# Patient Record
Sex: FEMALE | Race: WHITE | NOT HISPANIC OR LATINO | Employment: OTHER | URBAN - METROPOLITAN AREA
[De-identification: names, ages, dates, MRNs, and addresses within clinical notes are randomized per-mention and may not be internally consistent; named-entity substitution may affect disease eponyms.]

---

## 2023-03-24 PROBLEM — J96.11 CHRONIC RESPIRATORY FAILURE WITH HYPOXIA, ON HOME O2 THERAPY (MULTI): Status: ACTIVE | Noted: 2023-03-24

## 2023-03-24 PROBLEM — J02.9 SORE THROAT: Status: ACTIVE | Noted: 2023-03-24

## 2023-03-24 PROBLEM — F41.1 GAD (GENERALIZED ANXIETY DISORDER): Status: ACTIVE | Noted: 2023-03-24

## 2023-03-24 PROBLEM — R06.02 SOB (SHORTNESS OF BREATH): Status: ACTIVE | Noted: 2023-03-24

## 2023-03-24 PROBLEM — J44.9 COPD, MODERATE (MULTI): Status: ACTIVE | Noted: 2023-03-24

## 2023-03-24 PROBLEM — F41.9 ANXIETY AND DEPRESSION: Status: ACTIVE | Noted: 2023-03-24

## 2023-03-24 PROBLEM — S20.219A RIB CONTUSION: Status: ACTIVE | Noted: 2023-03-24

## 2023-03-24 PROBLEM — R07.81 RIB PAIN ON LEFT SIDE: Status: ACTIVE | Noted: 2023-03-24

## 2023-03-24 PROBLEM — M25.512 LEFT SHOULDER PAIN: Status: ACTIVE | Noted: 2023-03-24

## 2023-03-24 PROBLEM — I25.10 CAD (CORONARY ATHEROSCLEROTIC DISEASE): Status: ACTIVE | Noted: 2023-03-24

## 2023-03-24 PROBLEM — K21.9 CHRONIC GERD: Status: ACTIVE | Noted: 2023-03-24

## 2023-03-24 PROBLEM — J30.2 SEASONAL ALLERGIES: Status: ACTIVE | Noted: 2023-03-24

## 2023-03-24 PROBLEM — I10 ESSENTIAL HYPERTENSION: Status: ACTIVE | Noted: 2023-03-24

## 2023-03-24 PROBLEM — E78.5 HYPERLIPIDEMIA: Status: ACTIVE | Noted: 2023-03-24

## 2023-03-24 PROBLEM — R09.89 CAROTID BRUIT: Status: ACTIVE | Noted: 2023-03-24

## 2023-03-24 PROBLEM — J31.0 CHRONIC RHINITIS: Status: ACTIVE | Noted: 2023-03-24

## 2023-03-24 PROBLEM — H66.001 NON-RECURRENT ACUTE SUPPURATIVE OTITIS MEDIA OF RIGHT EAR WITHOUT SPONTANEOUS RUPTURE OF TYMPANIC MEMBRANE: Status: ACTIVE | Noted: 2023-03-24

## 2023-03-24 PROBLEM — Z99.81 CHRONIC RESPIRATORY FAILURE WITH HYPOXIA, ON HOME O2 THERAPY (MULTI): Status: ACTIVE | Noted: 2023-03-24

## 2023-03-24 PROBLEM — F32.A ANXIETY AND DEPRESSION: Status: ACTIVE | Noted: 2023-03-24

## 2023-03-24 PROBLEM — Z98.61 POST PTCA: Status: ACTIVE | Noted: 2023-03-24

## 2024-03-05 ENCOUNTER — HOSPITAL ENCOUNTER (INPATIENT)
Facility: HOSPITAL | Age: 69
LOS: 2 days | Discharge: HOME | DRG: 190 | End: 2024-03-09
Attending: EMERGENCY MEDICINE | Admitting: INTERNAL MEDICINE
Payer: MEDICARE

## 2024-03-05 DIAGNOSIS — J44.1 COPD EXACERBATION (MULTI): Primary | ICD-10-CM

## 2024-03-05 PROCEDURE — 99285 EMERGENCY DEPT VISIT HI MDM: CPT | Mod: 25

## 2024-03-05 PROCEDURE — 93005 ELECTROCARDIOGRAM TRACING: CPT

## 2024-03-05 RX ORDER — IPRATROPIUM BROMIDE AND ALBUTEROL SULFATE 2.5; .5 MG/3ML; MG/3ML
9 SOLUTION RESPIRATORY (INHALATION) ONCE
Status: COMPLETED | OUTPATIENT
Start: 2024-03-05 | End: 2024-03-06

## 2024-03-05 ASSESSMENT — COLUMBIA-SUICIDE SEVERITY RATING SCALE - C-SSRS
1. IN THE PAST MONTH, HAVE YOU WISHED YOU WERE DEAD OR WISHED YOU COULD GO TO SLEEP AND NOT WAKE UP?: NO
2. HAVE YOU ACTUALLY HAD ANY THOUGHTS OF KILLING YOURSELF?: NO
6. HAVE YOU EVER DONE ANYTHING, STARTED TO DO ANYTHING, OR PREPARED TO DO ANYTHING TO END YOUR LIFE?: NO

## 2024-03-05 ASSESSMENT — LIFESTYLE VARIABLES
HAVE PEOPLE ANNOYED YOU BY CRITICIZING YOUR DRINKING: NO
EVER HAD A DRINK FIRST THING IN THE MORNING TO STEADY YOUR NERVES TO GET RID OF A HANGOVER: NO
HAVE YOU EVER FELT YOU SHOULD CUT DOWN ON YOUR DRINKING: NO
EVER FELT BAD OR GUILTY ABOUT YOUR DRINKING: NO

## 2024-03-05 ASSESSMENT — PAIN - FUNCTIONAL ASSESSMENT: PAIN_FUNCTIONAL_ASSESSMENT: 0-10

## 2024-03-05 ASSESSMENT — PAIN SCALES - GENERAL: PAINLEVEL_OUTOF10: 6

## 2024-03-06 ENCOUNTER — APPOINTMENT (OUTPATIENT)
Dept: RADIOLOGY | Facility: HOSPITAL | Age: 69
DRG: 190 | End: 2024-03-06
Payer: MEDICARE

## 2024-03-06 ENCOUNTER — APPOINTMENT (OUTPATIENT)
Dept: CARDIOLOGY | Facility: HOSPITAL | Age: 69
DRG: 190 | End: 2024-03-06
Payer: MEDICARE

## 2024-03-06 PROBLEM — J44.1 COPD EXACERBATION (MULTI): Status: ACTIVE | Noted: 2024-03-06

## 2024-03-06 LAB
ALBUMIN SERPL BCP-MCNC: 3.8 G/DL (ref 3.4–5)
ALP SERPL-CCNC: 70 U/L (ref 33–136)
ALT SERPL W P-5'-P-CCNC: 25 U/L (ref 7–45)
ANION GAP BLDV CALCULATED.4IONS-SCNC: 5 MMOL/L (ref 10–25)
ANION GAP SERPL CALC-SCNC: 11 MMOL/L (ref 10–20)
ANION GAP SERPL CALC-SCNC: 13 MMOL/L (ref 10–20)
AST SERPL W P-5'-P-CCNC: 16 U/L (ref 9–39)
BASE EXCESS BLDV CALC-SCNC: 3.9 MMOL/L (ref -2–3)
BASE EXCESS BLDV CALC-SCNC: 5.9 MMOL/L (ref -2–3)
BASOPHILS # BLD AUTO: 0.05 X10*3/UL (ref 0–0.1)
BASOPHILS NFR BLD AUTO: 0.5 %
BILIRUB SERPL-MCNC: 0.3 MG/DL (ref 0–1.2)
BNP SERPL-MCNC: 24 PG/ML (ref 0–99)
BODY TEMPERATURE: 37 DEGREES CELSIUS
BODY TEMPERATURE: 37 DEGREES CELSIUS
BUN SERPL-MCNC: 25 MG/DL (ref 6–23)
BUN SERPL-MCNC: 25 MG/DL (ref 6–23)
CA-I BLDV-SCNC: 1.18 MMOL/L (ref 1.1–1.33)
CALCIUM SERPL-MCNC: 8.5 MG/DL (ref 8.6–10.3)
CALCIUM SERPL-MCNC: 9 MG/DL (ref 8.6–10.3)
CARDIAC TROPONIN I PNL SERPL HS: 3 NG/L (ref 0–13)
CARDIAC TROPONIN I PNL SERPL HS: 3 NG/L (ref 0–13)
CARDIAC TROPONIN I PNL SERPL HS: <3 NG/L (ref 0–13)
CHLORIDE BLDV-SCNC: 101 MMOL/L (ref 98–107)
CHLORIDE SERPL-SCNC: 100 MMOL/L (ref 98–107)
CHLORIDE SERPL-SCNC: 100 MMOL/L (ref 98–107)
CO2 SERPL-SCNC: 28 MMOL/L (ref 21–32)
CO2 SERPL-SCNC: 30 MMOL/L (ref 21–32)
CREAT SERPL-MCNC: 0.81 MG/DL (ref 0.5–1.05)
CREAT SERPL-MCNC: 0.9 MG/DL (ref 0.5–1.05)
EGFRCR SERPLBLD CKD-EPI 2021: 70 ML/MIN/1.73M*2
EGFRCR SERPLBLD CKD-EPI 2021: 80 ML/MIN/1.73M*2
EOSINOPHIL # BLD AUTO: 0.19 X10*3/UL (ref 0–0.7)
EOSINOPHIL NFR BLD AUTO: 1.9 %
ERYTHROCYTE [DISTWIDTH] IN BLOOD BY AUTOMATED COUNT: 12.6 % (ref 11.5–14.5)
ERYTHROCYTE [DISTWIDTH] IN BLOOD BY AUTOMATED COUNT: 12.8 % (ref 11.5–14.5)
EST. AVERAGE GLUCOSE BLD GHB EST-MCNC: 137 MG/DL
FLUAV RNA RESP QL NAA+PROBE: NOT DETECTED
FLUBV RNA RESP QL NAA+PROBE: NOT DETECTED
GLUCOSE BLDV-MCNC: 139 MG/DL (ref 74–99)
GLUCOSE SERPL-MCNC: 147 MG/DL (ref 74–99)
GLUCOSE SERPL-MCNC: 153 MG/DL (ref 74–99)
HBA1C MFR BLD: 6.4 %
HCO3 BLDV-SCNC: 30.2 MMOL/L (ref 22–26)
HCO3 BLDV-SCNC: 32.5 MMOL/L (ref 22–26)
HCT VFR BLD AUTO: 37.9 % (ref 36–46)
HCT VFR BLD AUTO: 39.5 % (ref 36–46)
HCT VFR BLD EST: 38 % (ref 36–46)
HGB BLD-MCNC: 12.8 G/DL (ref 12–16)
HGB BLD-MCNC: 12.9 G/DL (ref 12–16)
HGB BLDV-MCNC: 12.8 G/DL (ref 12–16)
IMM GRANULOCYTES # BLD AUTO: 0.05 X10*3/UL (ref 0–0.7)
IMM GRANULOCYTES NFR BLD AUTO: 0.5 % (ref 0–0.9)
INHALED O2 CONCENTRATION: 28 %
INHALED O2 CONCENTRATION: 28 %
LACTATE BLDV-SCNC: 2.4 MMOL/L (ref 0.4–2)
LACTATE BLDV-SCNC: 3.4 MMOL/L (ref 0.4–2)
LIPASE SERPL-CCNC: 50 U/L (ref 9–82)
LYMPHOCYTES # BLD AUTO: 3.6 X10*3/UL (ref 1.2–4.8)
LYMPHOCYTES NFR BLD AUTO: 35.6 %
MCH RBC QN AUTO: 30.6 PG (ref 26–34)
MCH RBC QN AUTO: 31.4 PG (ref 26–34)
MCHC RBC AUTO-ENTMCNC: 32.7 G/DL (ref 32–36)
MCHC RBC AUTO-ENTMCNC: 33.8 G/DL (ref 32–36)
MCV RBC AUTO: 93 FL (ref 80–100)
MCV RBC AUTO: 94 FL (ref 80–100)
MONOCYTES # BLD AUTO: 0.77 X10*3/UL (ref 0.1–1)
MONOCYTES NFR BLD AUTO: 7.6 %
NEUTROPHILS # BLD AUTO: 5.46 X10*3/UL (ref 1.2–7.7)
NEUTROPHILS NFR BLD AUTO: 53.9 %
NRBC BLD-RTO: 0 /100 WBCS (ref 0–0)
NRBC BLD-RTO: 0 /100 WBCS (ref 0–0)
OXYHGB MFR BLDV: 84.2 % (ref 45–75)
OXYHGB MFR BLDV: 89.1 % (ref 45–75)
PCO2 BLDV: 51 MM HG (ref 41–51)
PCO2 BLDV: 55 MM HG (ref 41–51)
PH BLDV: 7.38 PH (ref 7.33–7.43)
PH BLDV: 7.38 PH (ref 7.33–7.43)
PLATELET # BLD AUTO: 160 X10*3/UL (ref 150–450)
PLATELET # BLD AUTO: 168 X10*3/UL (ref 150–450)
PO2 BLDV: 59 MM HG (ref 35–45)
PO2 BLDV: 67 MM HG (ref 35–45)
POTASSIUM BLDV-SCNC: 3.8 MMOL/L (ref 3.5–5.3)
POTASSIUM SERPL-SCNC: 3.5 MMOL/L (ref 3.5–5.3)
POTASSIUM SERPL-SCNC: 3.9 MMOL/L (ref 3.5–5.3)
PROT SERPL-MCNC: 6.6 G/DL (ref 6.4–8.2)
RBC # BLD AUTO: 4.07 X10*6/UL (ref 4–5.2)
RBC # BLD AUTO: 4.21 X10*6/UL (ref 4–5.2)
RSV RNA RESP QL NAA+PROBE: NOT DETECTED
SAO2 % BLDV: 87 % (ref 45–75)
SAO2 % BLDV: 92 % (ref 45–75)
SARS-COV-2 RNA RESP QL NAA+PROBE: NOT DETECTED
SODIUM BLDV-SCNC: 135 MMOL/L (ref 136–145)
SODIUM SERPL-SCNC: 137 MMOL/L (ref 136–145)
SODIUM SERPL-SCNC: 137 MMOL/L (ref 136–145)
WBC # BLD AUTO: 10.1 X10*3/UL (ref 4.4–11.3)
WBC # BLD AUTO: 7.8 X10*3/UL (ref 4.4–11.3)

## 2024-03-06 PROCEDURE — 85025 COMPLETE CBC W/AUTO DIFF WBC: CPT | Performed by: EMERGENCY MEDICINE

## 2024-03-06 PROCEDURE — 80053 COMPREHEN METABOLIC PANEL: CPT | Performed by: EMERGENCY MEDICINE

## 2024-03-06 PROCEDURE — 83690 ASSAY OF LIPASE: CPT | Performed by: EMERGENCY MEDICINE

## 2024-03-06 PROCEDURE — 83605 ASSAY OF LACTIC ACID: CPT | Performed by: EMERGENCY MEDICINE

## 2024-03-06 PROCEDURE — G0378 HOSPITAL OBSERVATION PER HR: HCPCS

## 2024-03-06 PROCEDURE — 83036 HEMOGLOBIN GLYCOSYLATED A1C: CPT | Performed by: INTERNAL MEDICINE

## 2024-03-06 PROCEDURE — 71045 X-RAY EXAM CHEST 1 VIEW: CPT | Performed by: RADIOLOGY

## 2024-03-06 PROCEDURE — 96374 THER/PROPH/DIAG INJ IV PUSH: CPT

## 2024-03-06 PROCEDURE — 96372 THER/PROPH/DIAG INJ SC/IM: CPT

## 2024-03-06 PROCEDURE — 2500000002 HC RX 250 W HCPCS SELF ADMINISTERED DRUGS (ALT 637 FOR MEDICARE OP, ALT 636 FOR OP/ED): Performed by: EMERGENCY MEDICINE

## 2024-03-06 PROCEDURE — 2500000001 HC RX 250 WO HCPCS SELF ADMINISTERED DRUGS (ALT 637 FOR MEDICARE OP): Performed by: INTERNAL MEDICINE

## 2024-03-06 PROCEDURE — 99223 1ST HOSP IP/OBS HIGH 75: CPT | Performed by: INTERNAL MEDICINE

## 2024-03-06 PROCEDURE — 84484 ASSAY OF TROPONIN QUANT: CPT | Performed by: EMERGENCY MEDICINE

## 2024-03-06 PROCEDURE — 96361 HYDRATE IV INFUSION ADD-ON: CPT

## 2024-03-06 PROCEDURE — 87637 SARSCOV2&INF A&B&RSV AMP PRB: CPT | Performed by: EMERGENCY MEDICINE

## 2024-03-06 PROCEDURE — 36415 COLL VENOUS BLD VENIPUNCTURE: CPT | Performed by: EMERGENCY MEDICINE

## 2024-03-06 PROCEDURE — 84132 ASSAY OF SERUM POTASSIUM: CPT | Performed by: INTERNAL MEDICINE

## 2024-03-06 PROCEDURE — 84132 ASSAY OF SERUM POTASSIUM: CPT | Performed by: EMERGENCY MEDICINE

## 2024-03-06 PROCEDURE — 83880 ASSAY OF NATRIURETIC PEPTIDE: CPT | Performed by: EMERGENCY MEDICINE

## 2024-03-06 PROCEDURE — 85027 COMPLETE CBC AUTOMATED: CPT | Performed by: INTERNAL MEDICINE

## 2024-03-06 PROCEDURE — 82805 BLOOD GASES W/O2 SATURATION: CPT | Performed by: INTERNAL MEDICINE

## 2024-03-06 PROCEDURE — 71045 X-RAY EXAM CHEST 1 VIEW: CPT

## 2024-03-06 PROCEDURE — 2500000004 HC RX 250 GENERAL PHARMACY W/ HCPCS (ALT 636 FOR OP/ED): Performed by: INTERNAL MEDICINE

## 2024-03-06 PROCEDURE — 2500000002 HC RX 250 W HCPCS SELF ADMINISTERED DRUGS (ALT 637 FOR MEDICARE OP, ALT 636 FOR OP/ED): Performed by: INTERNAL MEDICINE

## 2024-03-06 PROCEDURE — 84484 ASSAY OF TROPONIN QUANT: CPT | Performed by: INTERNAL MEDICINE

## 2024-03-06 RX ORDER — ACETAMINOPHEN 160 MG/5ML
650 SUSPENSION ORAL EVERY 4 HOURS PRN
Status: DISCONTINUED | OUTPATIENT
Start: 2024-03-06 | End: 2024-03-09 | Stop reason: HOSPADM

## 2024-03-06 RX ORDER — METOPROLOL TARTRATE 25 MG/1
25 TABLET, FILM COATED ORAL 2 TIMES DAILY
Status: DISCONTINUED | OUTPATIENT
Start: 2024-03-06 | End: 2024-03-09 | Stop reason: HOSPADM

## 2024-03-06 RX ORDER — ALBUTEROL SULFATE 0.83 MG/ML
2.5 SOLUTION RESPIRATORY (INHALATION) EVERY 6 HOURS PRN
Status: DISCONTINUED | OUTPATIENT
Start: 2024-03-06 | End: 2024-03-09 | Stop reason: HOSPADM

## 2024-03-06 RX ORDER — SODIUM CHLORIDE 9 MG/ML
75 INJECTION, SOLUTION INTRAVENOUS CONTINUOUS
Status: ACTIVE | OUTPATIENT
Start: 2024-03-06 | End: 2024-03-07

## 2024-03-06 RX ORDER — ACETAMINOPHEN 650 MG/1
650 SUPPOSITORY RECTAL EVERY 4 HOURS PRN
Status: DISCONTINUED | OUTPATIENT
Start: 2024-03-06 | End: 2024-03-09 | Stop reason: HOSPADM

## 2024-03-06 RX ORDER — CLOPIDOGREL BISULFATE 75 MG/1
75 TABLET ORAL DAILY
Status: DISCONTINUED | OUTPATIENT
Start: 2024-03-06 | End: 2024-03-09 | Stop reason: HOSPADM

## 2024-03-06 RX ORDER — ASPIRIN 81 MG/1
81 TABLET ORAL DAILY
Status: DISCONTINUED | OUTPATIENT
Start: 2024-03-06 | End: 2024-03-09 | Stop reason: HOSPADM

## 2024-03-06 RX ORDER — PANTOPRAZOLE SODIUM 40 MG/1
40 TABLET, DELAYED RELEASE ORAL
Status: DISCONTINUED | OUTPATIENT
Start: 2024-03-06 | End: 2024-03-09 | Stop reason: HOSPADM

## 2024-03-06 RX ORDER — TRAZODONE HYDROCHLORIDE 50 MG/1
50 TABLET ORAL NIGHTLY PRN
Status: DISCONTINUED | OUTPATIENT
Start: 2024-03-06 | End: 2024-03-09 | Stop reason: HOSPADM

## 2024-03-06 RX ORDER — GUAIFENESIN/DEXTROMETHORPHAN 100-10MG/5
5 SYRUP ORAL EVERY 4 HOURS PRN
Status: DISCONTINUED | OUTPATIENT
Start: 2024-03-06 | End: 2024-03-09 | Stop reason: HOSPADM

## 2024-03-06 RX ORDER — ACETAMINOPHEN 325 MG/1
650 TABLET ORAL EVERY 4 HOURS PRN
Status: DISCONTINUED | OUTPATIENT
Start: 2024-03-06 | End: 2024-03-09 | Stop reason: HOSPADM

## 2024-03-06 RX ORDER — DOXYCYCLINE 100 MG/1
100 CAPSULE ORAL 2 TIMES DAILY
Status: DISCONTINUED | OUTPATIENT
Start: 2024-03-06 | End: 2024-03-09 | Stop reason: HOSPADM

## 2024-03-06 RX ORDER — ALBUTEROL SULFATE 0.63 MG/3ML
0.63 SOLUTION RESPIRATORY (INHALATION) EVERY 4 HOURS PRN
COMMUNITY

## 2024-03-06 RX ORDER — ROSUVASTATIN CALCIUM 20 MG/1
20 TABLET, COATED ORAL DAILY
Status: DISCONTINUED | OUTPATIENT
Start: 2024-03-06 | End: 2024-03-09 | Stop reason: HOSPADM

## 2024-03-06 RX ORDER — LISINOPRIL 20 MG/1
40 TABLET ORAL DAILY
Status: DISCONTINUED | OUTPATIENT
Start: 2024-03-06 | End: 2024-03-09 | Stop reason: HOSPADM

## 2024-03-06 RX ORDER — BUSPIRONE HYDROCHLORIDE 5 MG/1
10 TABLET ORAL EVERY 12 HOURS
Status: DISCONTINUED | OUTPATIENT
Start: 2024-03-06 | End: 2024-03-09 | Stop reason: HOSPADM

## 2024-03-06 RX ORDER — ENOXAPARIN SODIUM 100 MG/ML
40 INJECTION SUBCUTANEOUS EVERY 24 HOURS
Status: DISCONTINUED | OUTPATIENT
Start: 2024-03-06 | End: 2024-03-09 | Stop reason: HOSPADM

## 2024-03-06 RX ADMIN — ROSUVASTATIN CALCIUM 20 MG: 20 TABLET, FILM COATED ORAL at 09:11

## 2024-03-06 RX ADMIN — ACETAMINOPHEN 650 MG: 325 TABLET ORAL at 18:52

## 2024-03-06 RX ADMIN — METHYLPREDNISOLONE SODIUM SUCCINATE 40 MG: 40 INJECTION, POWDER, FOR SOLUTION INTRAMUSCULAR; INTRAVENOUS at 18:50

## 2024-03-06 RX ADMIN — PANTOPRAZOLE SODIUM 40 MG: 40 TABLET, DELAYED RELEASE ORAL at 07:25

## 2024-03-06 RX ADMIN — BUSPIRONE HYDROCHLORIDE 10 MG: 5 TABLET ORAL at 04:24

## 2024-03-06 RX ADMIN — METHYLPREDNISOLONE SODIUM SUCCINATE 40 MG: 40 INJECTION, POWDER, FOR SOLUTION INTRAMUSCULAR; INTRAVENOUS at 08:01

## 2024-03-06 RX ADMIN — BUSPIRONE HYDROCHLORIDE 10 MG: 5 TABLET ORAL at 18:51

## 2024-03-06 RX ADMIN — DOXYCYCLINE 100 MG: 100 CAPSULE ORAL at 21:36

## 2024-03-06 RX ADMIN — CLOPIDOGREL 75 MG: 75 TABLET ORAL at 09:10

## 2024-03-06 RX ADMIN — ALBUTEROL SULFATE 2.5 MG: 2.5 SOLUTION RESPIRATORY (INHALATION) at 09:42

## 2024-03-06 RX ADMIN — SODIUM CHLORIDE 75 ML/HR: 9 INJECTION, SOLUTION INTRAVENOUS at 04:24

## 2024-03-06 RX ADMIN — LISINOPRIL 40 MG: 20 TABLET ORAL at 09:57

## 2024-03-06 RX ADMIN — DOXYCYCLINE 100 MG: 100 CAPSULE ORAL at 09:11

## 2024-03-06 RX ADMIN — ACETAMINOPHEN 650 MG: 325 TABLET ORAL at 09:58

## 2024-03-06 RX ADMIN — ASPIRIN 81 MG: 81 TABLET, COATED ORAL at 09:10

## 2024-03-06 RX ADMIN — TRAZODONE HYDROCHLORIDE 50 MG: 50 TABLET ORAL at 21:48

## 2024-03-06 RX ADMIN — IPRATROPIUM BROMIDE AND ALBUTEROL SULFATE 9 ML: 2.5; .5 SOLUTION RESPIRATORY (INHALATION) at 00:01

## 2024-03-06 RX ADMIN — ENOXAPARIN SODIUM 40 MG: 40 INJECTION SUBCUTANEOUS at 04:24

## 2024-03-06 RX ADMIN — SODIUM CHLORIDE 75 ML/HR: 9 INJECTION, SOLUTION INTRAVENOUS at 18:57

## 2024-03-06 RX ADMIN — METOPROLOL TARTRATE 25 MG: 25 TABLET, FILM COATED ORAL at 09:15

## 2024-03-06 RX ADMIN — METOPROLOL TARTRATE 25 MG: 25 TABLET, FILM COATED ORAL at 21:36

## 2024-03-06 SDOH — SOCIAL STABILITY: SOCIAL INSECURITY: ABUSE: ADULT

## 2024-03-06 SDOH — SOCIAL STABILITY: SOCIAL INSECURITY: WERE YOU ABLE TO COMPLETE ALL THE BEHAVIORAL HEALTH SCREENINGS?: YES

## 2024-03-06 SDOH — SOCIAL STABILITY: SOCIAL INSECURITY: HAVE YOU HAD THOUGHTS OF HARMING ANYONE ELSE?: NO

## 2024-03-06 ASSESSMENT — ENCOUNTER SYMPTOMS
PALPITATIONS: 0
EYES NEGATIVE: 1
SEIZURES: 0
SORE THROAT: 1
CHEST TIGHTNESS: 0
CHILLS: 1
NUMBNESS: 0
DYSURIA: 0
NAUSEA: 1
ABDOMINAL PAIN: 0
WHEEZING: 1
DIZZINESS: 0
COUGH: 1
WEAKNESS: 1
BACK PAIN: 1
FATIGUE: 1
SPEECH DIFFICULTY: 0
VOMITING: 0
CONFUSION: 0
FLANK PAIN: 0
NECK PAIN: 0
BLOOD IN STOOL: 0
POLYDIPSIA: 0
FEVER: 0
SHORTNESS OF BREATH: 1

## 2024-03-06 ASSESSMENT — COGNITIVE AND FUNCTIONAL STATUS - GENERAL
CLIMB 3 TO 5 STEPS WITH RAILING: A LITTLE
WALKING IN HOSPITAL ROOM: A LITTLE
MOBILITY SCORE: 22
DAILY ACTIVITIY SCORE: 23
PATIENT BASELINE BEDBOUND: NO
MOBILITY SCORE: 23
HELP NEEDED FOR BATHING: A LITTLE
DAILY ACTIVITIY SCORE: 24
CLIMB 3 TO 5 STEPS WITH RAILING: A LITTLE

## 2024-03-06 ASSESSMENT — ACTIVITIES OF DAILY LIVING (ADL)
ADEQUATE_TO_COMPLETE_ADL: YES
HEARING - LEFT EAR: FUNCTIONAL
BATHING: INDEPENDENT
WALKS IN HOME: INDEPENDENT
LACK_OF_TRANSPORTATION: NO
DRESSING YOURSELF: INDEPENDENT
JUDGMENT_ADEQUATE_SAFELY_COMPLETE_DAILY_ACTIVITIES: YES
GROOMING: INDEPENDENT
PATIENT'S MEMORY ADEQUATE TO SAFELY COMPLETE DAILY ACTIVITIES?: YES
TOILETING: INDEPENDENT
HEARING - RIGHT EAR: FUNCTIONAL
ASSISTIVE_DEVICE: OTHER (COMMENT)
FEEDING YOURSELF: INDEPENDENT

## 2024-03-06 ASSESSMENT — PAIN DESCRIPTION - LOCATION
LOCATION: RIB CAGE
LOCATION: HEAD

## 2024-03-06 ASSESSMENT — LIFESTYLE VARIABLES
HOW MANY STANDARD DRINKS CONTAINING ALCOHOL DO YOU HAVE ON A TYPICAL DAY: PATIENT DOES NOT DRINK
SKIP TO QUESTIONS 9-10: 1
AUDIT-C TOTAL SCORE: 0
AUDIT-C TOTAL SCORE: 0
HOW OFTEN DO YOU HAVE 6 OR MORE DRINKS ON ONE OCCASION: NEVER
HOW OFTEN DO YOU HAVE A DRINK CONTAINING ALCOHOL: NEVER

## 2024-03-06 ASSESSMENT — PAIN - FUNCTIONAL ASSESSMENT
PAIN_FUNCTIONAL_ASSESSMENT: 0-10
PAIN_FUNCTIONAL_ASSESSMENT: 0-10

## 2024-03-06 ASSESSMENT — PATIENT HEALTH QUESTIONNAIRE - PHQ9
1. LITTLE INTEREST OR PLEASURE IN DOING THINGS: NOT AT ALL
2. FEELING DOWN, DEPRESSED OR HOPELESS: NOT AT ALL
SUM OF ALL RESPONSES TO PHQ9 QUESTIONS 1 & 2: 0

## 2024-03-06 ASSESSMENT — PAIN SCALES - GENERAL
PAINLEVEL_OUTOF10: 3
PAINLEVEL_OUTOF10: 8

## 2024-03-06 NOTE — PROGRESS NOTES
Amy Montague is a 68 y.o. female admitted for COPD exacerbation (CMS/[facility]). Pharmacy reviewed the patient's [id number] medications and allergies for accuracy.    The list below reflects the PTA list prior to pharmacy medication history. A summary a changes to the PTA medication list has been listed below. Please review each medication in order reconciliation for additional clarification and justification.    Source of information:   PATIENT    Medications added:  ALBUTEROL 0.63% 1 VIAL QID PRN    Medications modified:  ACETAMINOPHEN 500 --> 1 TAB PRN  ALBUTEROL 90--> 1 PUFF BID PRN  SPIRVA 18MCG -->1 CAPSULE AT BEDTIME    Medications to be removed:  L-ACIDOPHILUS  METOPROLOL TARTRATE 25MG (DUPLICATE)    Medications of concern:      Prior to Admission Medications   Prescriptions Last Dose Informant Patient Reported? Taking?   FLUoxetine (PROzac) 40 mg capsule   Yes No   Sig: TAKE 1 CAPSULE BY MOUTH EVERY DAY FOR 30 DAYS   L. acidophilus/Bifid. animalis 32 billion cell capsule   Yes No   Sig: Take by mouth.   Spiriva with HandiHaler 18 mcg inhalation capsule   Yes No   acetaminophen (Tylenol Extra Strength) 500 mg tablet   Yes No   Sig: Take by mouth.   albuterol (ProAir RespiClick) 90 mcg/actuation aerosol powdr breath activated inhaler   Yes No   Sig: Inhale.   aspirin 81 mg EC tablet   Yes No   Sig: Take 1 tablet (81 mg) by mouth once daily.   busPIRone (Buspar) 10 mg tablet   Yes No   Sig: Take 1 tablet (10 mg) by mouth in the morning and 1 tablet (10 mg) in the evening.   cetirizine (ZyrTEC) 10 mg tablet   Yes No   Sig: Take 1 tablet (10 mg) by mouth once daily.   clonazePAM (KlonoPIN) 1 mg tablet   Yes No   Sig: Take 1 tablet (1 mg) by mouth 2 times a day as needed.   clopidogrel (Plavix) 75 mg tablet   Yes No   Sig: Take 1 tablet (75 mg) by mouth once daily.   hydroCHLOROthiazide (HYDRODiuril) 25 mg tablet   Yes No   Sig: Take 1 tablet (25 mg) by mouth once daily.   lisinopril 40 mg tablet   Yes No    Sig: Take 1 tablet (40 mg) by mouth once daily.   metoprolol tartrate (Lopressor) 25 mg tablet   Yes No   Sig: Take by mouth twice a day.   metoprolol tartrate (Lopressor) 25 mg tablet   Yes No   Sig: Take 1 tablet (25 mg) by mouth in the morning and 1 tablet (25 mg) before bedtime.   omeprazole (PriLOSEC) 40 mg DR capsule   Yes No   Sig: TAKE 1 CAPSULE BY MOUTH EVERY DAY 30 MINUTES BEFORE MORNING MEAL   oxygen (O2) gas therapy   Yes No   Sig: Oxygen   Refills: 0        Start : 5-Nov-2020   Active   rosuvastatin (Crestor) 20 mg tablet   Yes No   Sig: Take 1 tablet (20 mg) by mouth once daily.   traZODone (Desyrel) 50 mg tablet   Yes No   Sig: Take 1 tablet (50 mg) by mouth as needed at bedtime.      Facility-Administered Medications: None       Nia Zarate CPhT

## 2024-03-06 NOTE — PROGRESS NOTES
I have reviewed Dr. Curtis's assessment and plan as documented in his 03/06/24 H/P.      Jose D Hernandez MD PhD

## 2024-03-06 NOTE — ED PROVIDER NOTES
"HPI   Chief Complaint   Patient presents with    Dizziness     X 3-4 days    Shortness of Breath     \"Been going on for a while\" hx COPD, supposed to be on home oxygen but not approved by insurance. Today \"felt like I might die\" called EMS, EMS gave duoneb, solumedrol, and 3 ASA. Per squad pt was \"working to breathe with wheezes\"       Chief complaint: Shortness of breath    History of present illness: Patient is a 68-year-old female with history of shortness of breath.  According to the patient, she has a history of coronary artery disease COPD hypertension hyperlipidemia.  According to the patient, she is supposed to be taking home oxygen however, the oxygen has not approved by insurance and as result, the patient does not have this therapy at home.  The patient states that over the past 3 to 4 days she has been feeling increasingly short of breath.  Today, the patient was extremely short of breath even while at rest.  The patient states that they felt like she was about to die so 911 was called and the patient was brought to the emergency department by EMS.  And route, the patient was given DuoNeb, aspirin, and Solu-Medrol.  The patient states that she has tightness in her chest right now she denies any diaphoresis.  She denies any recent fever.        History provided by:  Patient   used: No                        Charly Coma Scale Score: 15                     Patient History   Past Medical History:   Diagnosis Date    Old myocardial infarction 11/19/2020    H/O non-ST elevation myocardial infarction (NSTEMI)    Opioid dependence, uncomplicated (CMS/[facility]) 11/05/2020    Opioid dependence    Personal history of other diseases of the digestive system     History of ischemic colitis    Personal history of other infectious and parasitic diseases     H/O Clostridium difficile infection     Past Surgical History:   Procedure Laterality Date    OTHER SURGICAL HISTORY  11/11/2019    Retinal " detachment repair    OTHER SURGICAL HISTORY  11/11/2019    Carpal tunnel surgery    OTHER SURGICAL HISTORY  11/05/2020    Tubal ligation    OTHER SURGICAL HISTORY  02/23/2022    Eye surgery    OTHER SURGICAL HISTORY  11/05/2020    Coronary artery stent placement     No family history on file.  Social History     Tobacco Use    Smoking status: Every Day     Packs/day: .5     Types: Cigarettes    Smokeless tobacco: Never   Vaping Use    Vaping Use: Never used   Substance Use Topics    Alcohol use: Never    Drug use: Never       Physical Exam   ED Triage Vitals [03/05/24 2348]   Temperature Heart Rate Respirations BP   36.4 °C (97.6 °F) 67 18 145/60      Pulse Ox Temp Source Heart Rate Source Patient Position   (!) 92 % Temporal Monitor --      BP Location FiO2 (%)     -- --       Physical Exam  Constitutional:       Appearance: Normal appearance.   HENT:      Head: Normocephalic and atraumatic.      Right Ear: External ear normal.      Left Ear: External ear normal.      Nose: Nose normal.      Mouth/Throat:      Mouth: Mucous membranes are moist.   Eyes:      General: Lids are normal.      Extraocular Movements: Extraocular movements intact.      Pupils: Pupils are equal, round, and reactive to light.   Cardiovascular:      Rate and Rhythm: Normal rate and regular rhythm.      Heart sounds: Normal heart sounds.   Pulmonary:      Effort: Pulmonary effort is normal.      Breath sounds: Wheezing present.   Abdominal:      General: Abdomen is flat.      Palpations: Abdomen is soft.      Tenderness: There is no abdominal tenderness. There is no guarding or rebound.   Musculoskeletal:         General: No deformity. Normal range of motion.      Cervical back: Normal range of motion and neck supple.   Skin:     General: Skin is warm.      Capillary Refill: Capillary refill takes less than 2 seconds.      Coloration: Skin is not jaundiced.   Neurological:      General: No focal deficit present.      Mental Status: She is  alert and oriented to person, place, and time.   Psychiatric:         Mood and Affect: Mood normal.         Behavior: Behavior normal.         ED Course & [facility]   Diagnoses as of 03/11/24 1113   COPD exacerbation (CMS/[facility])       Medical Decision Making  Medical decision making: Patient remained stable throughout her time in the emergency department.  CBC demonstrated no significant abnormalities Chem-7 and LFTs were all within normal limits lipase BNP troponin influenza COVID and RSV were all within normal limits.  The patient's chest x-ray demonstrated no significant acute abnormalities.  Wound meanwhile the patient's EKG demonstrated a normal sinus rhythm with a rate of 69 bpm isoelectric ST segments narrow QRS complexes and a QTc of 487.    Patient presents to the emergency department with complaints of shortness of breath.  Initially presentation the emergency department, the patient was dyspneic at rest.  The patient was placed on supplemental O2 and given DuoNebs x 3.  At this time, I feel the patient will require admission to the hospital for further evaluation and therapy for what is likely COPD exacerbation.  I spoke with hospitalist who agreed the patient could be admitted to their service.  Patient was admitted to the hospital in otherwise stable condition.    Amount and/or Complexity of Data Reviewed  Labs: ordered. Decision-making details documented in ED Course.  Radiology: ordered. Decision-making details documented in ED Course.  ECG/medicine tests: ordered and independent interpretation performed. Decision-making details documented in ED Course.        Procedure  Procedures     Yousif Ewing MD  03/11/24 112

## 2024-03-06 NOTE — H&P
History Of Present Illness  Amy Montague is a 68 y.o. female with past medical history of coronary artery disease, hypertension, dyslipidemia, COPD presenting with cough and shortness of breath over the past 4 days..  Patient denies having any significant sputum production.  She is having a lot of wheezing.  Denies having any nausea vomiting or diarrhea.  Denies any dysuria frequency or urgency.  Patient has not been having any chest pain pleurisy or hemoptysis.  In the emergency room blood pressure 145/60 pulse of 67 respiratory rate of 18 room air pulse ox was 92% patient was placed on O2 as she supposedly desatted to 88% with ambulation.  Blood work shows CMP largely unremarkable patient is a BUN of 25 creatinine of 0.81.  CBC is unremarkable.  Venous gas shows pH of 7.38 with pCO2 of 55.  Influenza A influenza B RSV and coronavirus are all not detected.  Chest x-ray shows no acute cardiopulmonary process pulmonary nodules described from October 2022 CT are not well-evaluated on this film  Patient is being admitted to the hospital for COPD exacerbation.  Patient received Solu-Medrol in the emergency room and aerosol treatments without significant improvement in air movement at this time.  Patient has borderline oxygenation.  We will place her on a few liters nasal cannula for initial response until she opens up from the beta agonist and steroids.    Past Medical History  She has a past medical history of Old myocardial infarction (11/19/2020), Opioid dependence, uncomplicated (CMS/[facility]) (11/05/2020), Personal history of other diseases of the digestive system, and Personal history of other infectious and parasitic diseases.  coronary artery disease, hypertension, dyslipidemia, COPD    Surgical History  She has a past surgical history that includes Other surgical history (11/11/2019); Other surgical history (11/11/2019); Other surgical history (11/05/2020); Other surgical history (02/23/2022); and Other surgical  history (11/05/2020).     Social History  She reports that she has been smoking cigarettes. She has been smoking an average of .5 packs per day. She has never used smokeless tobacco. She reports that she does not drink alcohol and does not use drugs.    Family History  No family history on file.     Allergies  Patient has no known allergies.    Meds    Current Facility-Administered Medications:     acetaminophen (Tylenol) tablet 650 mg, 650 mg, oral, q4h PRN **OR** acetaminophen (Tylenol) suspension 650 mg, 650 mg, nasogastric tube, q4h PRN **OR** acetaminophen (Tylenol) suppository 650 mg, 650 mg, rectal, q4h PRN, Mac Curtis MD    acetaminophen (Tylenol) tablet 650 mg, 650 mg, oral, q4h PRN **OR** acetaminophen (Tylenol) suspension 650 mg, 650 mg, oral, q4h PRN **OR** acetaminophen (Tylenol) suppository 650 mg, 650 mg, rectal, q4h PRN, Mac Curtis MD    albuterol 2.5 mg /3 mL (0.083 %) nebulizer solution 2.5 mg, 2.5 mg, nebulization, q6h PRN, Mac Curtis MD    aspirin EC tablet 81 mg, 81 mg, oral, Daily, Mac Curtis MD    benzocaine-menthol (Cepastat Sore Throat) 15-3.6 mg lozenge 1 lozenge, 1 lozenge, Mouth/Throat, q2h PRN, Mac Curtis MD    busPIRone (Buspar) tablet 10 mg, 10 mg, oral, q12h, Mac Curtis MD, 10 mg at 03/06/24 0424    clopidogrel (Plavix) tablet 75 mg, 75 mg, oral, Daily, Mac Curtis MD    dextromethorphan-guaifenesin (Robitussin DM)  mg/5 mL oral liquid 5 mL, 5 mL, oral, q4h PRN, Mac Curtis MD    doxycycline (Vibramycin) capsule 100 mg, 100 mg, oral, BID, Mac Curtis MD    enoxaparin (Lovenox) syringe 40 mg, 40 mg, subcutaneous, q24h, Mac Curtis MD, 40 mg at 03/06/24 0424    lisinopril tablet 40 mg, 40 mg, oral, Daily, Mac Curtis MD    methylPREDNISolone sod succinate (PF) (SOLU-Medrol) 40 mg/mL injection 40 mg, 40 mg, intravenous, q8h KATJA, Mac Curtis MD    methylPREDNISolone sod succinate (SOLU-Medrol) injection 125 mg, 125  mg, intravenous, Once, Yousif Ewing MD    metoprolol tartrate (Lopressor) tablet 25 mg, 25 mg, oral, BID, Mac Curtis MD    oxygen (O2) therapy, , inhalation, Continuous PRN - O2/gases, Mac Curtis MD    pantoprazole (ProtoNix) EC tablet 40 mg, 40 mg, oral, Daily before breakfast, Mac Curtis MD    rosuvastatin (Crestor) tablet 20 mg, 20 mg, oral, Daily, Mac Curtis MD    sodium chloride 0.9% infusion, 75 mL/hr, intravenous, Continuous, Mac Curtis MD, Last Rate: 75 mL/hr at 03/06/24 0424, 75 mL/hr at 03/06/24 0424    tiotropium (Spiriva Respimat) 2.5 mcg/actuation inhaler 2 puff, 2 puff, inhalation, Daily, Mac Curtis MD    traZODone (Desyrel) tablet 50 mg, 50 mg, oral, Nightly PRN, Mac Curtis MD    Current Outpatient Medications:     acetaminophen (Tylenol Extra Strength) 500 mg tablet, Take by mouth., Disp: , Rfl:     albuterol (ProAir RespiClick) 90 mcg/actuation aerosol powdr breath activated inhaler, Inhale., Disp: , Rfl:     aspirin 81 mg EC tablet, Take 1 tablet (81 mg) by mouth once daily., Disp: , Rfl:     busPIRone (Buspar) 10 mg tablet, Take 1 tablet (10 mg) by mouth in the morning and 1 tablet (10 mg) in the evening., Disp: , Rfl:     cetirizine (ZyrTEC) 10 mg tablet, Take 1 tablet (10 mg) by mouth once daily., Disp: , Rfl:     clonazePAM (KlonoPIN) 1 mg tablet, Take 1 tablet (1 mg) by mouth 2 times a day as needed., Disp: , Rfl:     clopidogrel (Plavix) 75 mg tablet, Take 1 tablet (75 mg) by mouth once daily., Disp: , Rfl:     FLUoxetine (PROzac) 40 mg capsule, TAKE 1 CAPSULE BY MOUTH EVERY DAY FOR 30 DAYS, Disp: , Rfl:     hydroCHLOROthiazide (HYDRODiuril) 25 mg tablet, Take 1 tablet (25 mg) by mouth once daily., Disp: , Rfl:     L. acidophilus/Bifid. animalis 32 billion cell capsule, Take by mouth., Disp: , Rfl:     lisinopril 40 mg tablet, Take 1 tablet (40 mg) by mouth once daily., Disp: , Rfl:     metoprolol tartrate (Lopressor) 25 mg tablet, Take by mouth  twice a day., Disp: , Rfl:     metoprolol tartrate (Lopressor) 25 mg tablet, Take 1 tablet (25 mg) by mouth in the morning and 1 tablet (25 mg) before bedtime., Disp: , Rfl:     omeprazole (PriLOSEC) 40 mg DR capsule, TAKE 1 CAPSULE BY MOUTH EVERY DAY 30 MINUTES BEFORE MORNING MEAL, Disp: , Rfl:     oxygen (O2) gas therapy, Oxygen  Refills: 0      Start : 5-Nov-2020  Active, Disp: , Rfl:     rosuvastatin (Crestor) 20 mg tablet, Take 1 tablet (20 mg) by mouth once daily., Disp: , Rfl:     Spiriva with HandiHaler 18 mcg inhalation capsule, , Disp: , Rfl:     traZODone (Desyrel) 50 mg tablet, Take 1 tablet (50 mg) by mouth as needed at bedtime., Disp: , Rfl:     Review of Systems   Constitutional:  Positive for chills and fatigue. Negative for fever.   HENT:  Positive for sore throat. Negative for congestion.    Eyes: Negative.    Respiratory:  Positive for cough, shortness of breath and wheezing. Negative for chest tightness.    Cardiovascular:  Negative for chest pain, palpitations and leg swelling.   Gastrointestinal:  Positive for nausea. Negative for abdominal pain, blood in stool and vomiting.   Endocrine: Negative for polydipsia and polyuria.   Genitourinary:  Negative for dysuria and flank pain.   Musculoskeletal:  Positive for back pain. Negative for gait problem and neck pain.   Skin: Negative.    Neurological:  Positive for weakness. Negative for dizziness, seizures, speech difficulty and numbness.   Psychiatric/Behavioral:  Negative for confusion.         Physical Exam  Constitutional:       General: She is in acute distress.      Appearance: Normal appearance.   HENT:      Head: Normocephalic and atraumatic.      Mouth/Throat:      Mouth: Mucous membranes are moist.   Eyes:      Extraocular Movements: Extraocular movements intact.      Pupils: Pupils are equal, round, and reactive to light.   Cardiovascular:      Rate and Rhythm: Normal rate and regular rhythm.      Pulses: Normal pulses.      Heart  sounds: No murmur heard.  Pulmonary:      Breath sounds: Wheezing present. No rhonchi or rales.   Abdominal:      General: Abdomen is flat. Bowel sounds are normal.      Palpations: Abdomen is soft.      Tenderness: There is no abdominal tenderness.   Musculoskeletal:         General: Normal range of motion.      Cervical back: Neck supple. No tenderness.      Right lower leg: No edema.      Left lower leg: No edema.   Skin:     Coloration: Skin is not jaundiced.      Findings: No erythema or rash.   Neurological:      General: No focal deficit present.      Mental Status: She is alert and oriented to person, place, and time.   Psychiatric:         Mood and Affect: Mood normal.         Thought Content: Thought content normal.         Judgment: Judgment normal.          Last Recorded Vitals  /57   Pulse 74   Temp 36.4 °C (97.6 °F) (Temporal)   Resp (!) 30   Wt 68 kg (150 lb)   SpO2 95%     Relevant Results     Results for orders placed or performed during the hospital encounter of 03/05/24 (from the past 24 hour(s))   CBC and Auto Differential   Result Value Ref Range    WBC 10.1 4.4 - 11.3 x10*3/uL    nRBC 0.0 0.0 - 0.0 /100 WBCs    RBC 4.07 4.00 - 5.20 x10*6/uL    Hemoglobin 12.8 12.0 - 16.0 g/dL    Hematocrit 37.9 36.0 - 46.0 %    MCV 93 80 - 100 fL    MCH 31.4 26.0 - 34.0 pg    MCHC 33.8 32.0 - 36.0 g/dL    RDW 12.8 11.5 - 14.5 %    Platelets 168 150 - 450 x10*3/uL    Neutrophils % 53.9 40.0 - 80.0 %    Immature Granulocytes %, Automated 0.5 0.0 - 0.9 %    Lymphocytes % 35.6 13.0 - 44.0 %    Monocytes % 7.6 2.0 - 10.0 %    Eosinophils % 1.9 0.0 - 6.0 %    Basophils % 0.5 0.0 - 2.0 %    Neutrophils Absolute 5.46 1.20 - 7.70 x10*3/uL    Immature Granulocytes Absolute, Automated 0.05 0.00 - 0.70 x10*3/uL    Lymphocytes Absolute 3.60 1.20 - 4.80 x10*3/uL    Monocytes Absolute 0.77 0.10 - 1.00 x10*3/uL    Eosinophils Absolute 0.19 0.00 - 0.70 x10*3/uL    Basophils Absolute 0.05 0.00 - 0.10 x10*3/uL    Comprehensive metabolic panel   Result Value Ref Range    Glucose 147 (H) 74 - 99 mg/dL    Sodium 137 136 - 145 mmol/L    Potassium 3.5 3.5 - 5.3 mmol/L    Chloride 100 98 - 107 mmol/L    Bicarbonate 30 21 - 32 mmol/L    Anion Gap 11 10 - 20 mmol/L    Urea Nitrogen 25 (H) 6 - 23 mg/dL    Creatinine 0.81 0.50 - 1.05 mg/dL    eGFR 80 >60 mL/min/1.73m*2    Calcium 8.5 (L) 8.6 - 10.3 mg/dL    Albumin 3.8 3.4 - 5.0 g/dL    Alkaline Phosphatase 70 33 - 136 U/L    Total Protein 6.6 6.4 - 8.2 g/dL    AST 16 9 - 39 U/L    Bilirubin, Total 0.3 0.0 - 1.2 mg/dL    ALT 25 7 - 45 U/L   Lipase   Result Value Ref Range    Lipase 50 9 - 82 U/L   B-Type Natriuretic Peptide   Result Value Ref Range    BNP 24 0 - 99 pg/mL   Troponin I, High Sensitivity, Initial   Result Value Ref Range    Troponin I, High Sensitivity 3 0 - 13 ng/L   Troponin, High Sensitivity, 1 Hour   Result Value Ref Range    Troponin I, High Sensitivity 3 0 - 13 ng/L   Influenza A, and B PCR   Result Value Ref Range    Flu A Result Not Detected Not Detected    Flu B Result Not Detected Not Detected   Sars-CoV-2 PCR   Result Value Ref Range    Coronavirus 2019, PCR Not Detected Not Detected   RSV PCR   Result Value Ref Range    RSV PCR Not Detected Not Detected   Blood Gas Venous Full Panel   Result Value Ref Range    POCT pH, Venous 7.38 7.33 - 7.43 pH    POCT pCO2, Venous 55 (H) 41 - 51 mm Hg    POCT pO2, Venous 59 (H) 35 - 45 mm Hg    POCT SO2, Venous 87 (H) 45 - 75 %    POCT Oxy Hemoglobin, Venous 84.2 (H) 45.0 - 75.0 %    POCT Hematocrit Calculated, Venous 38.0 36.0 - 46.0 %    POCT Sodium, Venous 135 (L) 136 - 145 mmol/L    POCT Potassium, Venous 3.8 3.5 - 5.3 mmol/L    POCT Chloride, Venous 101 98 - 107 mmol/L    POCT Ionized Calicum, Venous 1.18 1.10 - 1.33 mmol/L    POCT Glucose, Venous 139 (H) 74 - 99 mg/dL    POCT Lactate, Venous 2.4 (H) 0.4 - 2.0 mmol/L    POCT Base Excess, Venous 5.9 (H) -2.0 - 3.0 mmol/L    POCT HCO3 Calculated, Venous 32.5 (H) 22.0 -  26.0 mmol/L    POCT Hemoglobin, Venous 12.8 12.0 - 16.0 g/dL    POCT Anion Gap, Venous 5.0 (L) 10.0 - 25.0 mmol/L    Patient Temperature 37.0 degrees Celsius    FiO2 28 %   Blood Gas Lactic Acid, Venous   Result Value Ref Range    POCT Lactate, Venous 3.4 (H) 0.4 - 2.0 mmol/L   Blood Gas Venous   Result Value Ref Range    POCT pH, Venous 7.38 7.33 - 7.43 pH    POCT pCO2, Venous 51 41 - 51 mm Hg    POCT pO2, Venous 67 (H) 35 - 45 mm Hg    POCT SO2, Venous 92 (H) 45 - 75 %    POCT Oxy Hemoglobin, Venous 89.1 (H) 45.0 - 75.0 %    POCT Base Excess, Venous 3.9 (H) -2.0 - 3.0 mmol/L    POCT HCO3 Calculated, Venous 30.2 (H) 22.0 - 26.0 mmol/L    Patient Temperature 37.0 degrees Celsius    FiO2 28 %   Troponin I, High Sensitivity   Result Value Ref Range    Troponin I, High Sensitivity <3 0 - 13 ng/L   Basic metabolic panel   Result Value Ref Range    Glucose 153 (H) 74 - 99 mg/dL    Sodium 137 136 - 145 mmol/L    Potassium 3.9 3.5 - 5.3 mmol/L    Chloride 100 98 - 107 mmol/L    Bicarbonate 28 21 - 32 mmol/L    Anion Gap 13 10 - 20 mmol/L    Urea Nitrogen 25 (H) 6 - 23 mg/dL    Creatinine 0.90 0.50 - 1.05 mg/dL    eGFR 70 >60 mL/min/1.73m*2    Calcium 9.0 8.6 - 10.3 mg/dL   CBC   Result Value Ref Range    WBC 7.8 4.4 - 11.3 x10*3/uL    nRBC 0.0 0.0 - 0.0 /100 WBCs    RBC 4.21 4.00 - 5.20 x10*6/uL    Hemoglobin 12.9 12.0 - 16.0 g/dL    Hematocrit 39.5 36.0 - 46.0 %    MCV 94 80 - 100 fL    MCH 30.6 26.0 - 34.0 pg    MCHC 32.7 32.0 - 36.0 g/dL    RDW 12.6 11.5 - 14.5 %    Platelets 160 150 - 450 x10*3/uL        Recent Imaging  XR chest 1 view    Result Date: 3/6/2024  Interpreted By:  Casa Ramon, STUDY: XR CHEST 1 VIEW;  3/6/2024 12:13 am   INDICATION: Signs/Symptoms:SOB.   COMPARISON: 10/03/2022 CT chest   ACCESSION NUMBER(S): [id number]   ORDERING CLINICIAN: DAVID ROMERO   FINDINGS:     No consolidation, pleural effusion, or pneumothorax. Heart size is normal. No acute osseous abnormality. Upper abdomen and  superficial soft tissues are unremarkable.       1. No acute cardiopulmonary process. 2. Pulmonary nodules described on the 10/03/2022 CT chest are not well evaluated radiographically. Continued CT screening is recommended.   Signed by: Casa Ramon 3/6/2024 12:45 AM Dictation workstation:   [id number]       Assessment and Plan  #1 COPD exacerbation  Patient reportedly was hypoxic with ambulation in the emergency room I do not see that documented anywhere but was told that she desatted into the 87% range.  Patient is receiving Solu-Medrol beta agonist and doxycycline.  Chest x-ray does not show evidence of any infiltrate at this time.  Patient is negative for viral illnesses.  Anticipate short hospital stay    #2 coronary artery disease  No active chest discomfort with her shortness of breath.  Troponin is negative.    #3 hypertension  Patient is continue on home meds.    #4 hyperglycemia  Denies any history of diabetes.  Anticipate blood sugars may run higher with patient's Solu-Medrol.  Check a hemoglobin A1c.      Mac Curtis MD

## 2024-03-07 PROCEDURE — 87075 CULTR BACTERIA EXCEPT BLOOD: CPT | Mod: PORLAB | Performed by: INTERNAL MEDICINE

## 2024-03-07 PROCEDURE — 99233 SBSQ HOSP IP/OBS HIGH 50: CPT | Performed by: INTERNAL MEDICINE

## 2024-03-07 PROCEDURE — 1200000002 HC GENERAL ROOM WITH TELEMETRY DAILY

## 2024-03-07 PROCEDURE — 2500000001 HC RX 250 WO HCPCS SELF ADMINISTERED DRUGS (ALT 637 FOR MEDICARE OP): Performed by: INTERNAL MEDICINE

## 2024-03-07 PROCEDURE — 2500000004 HC RX 250 GENERAL PHARMACY W/ HCPCS (ALT 636 FOR OP/ED): Performed by: INTERNAL MEDICINE

## 2024-03-07 PROCEDURE — 2500000002 HC RX 250 W HCPCS SELF ADMINISTERED DRUGS (ALT 637 FOR MEDICARE OP, ALT 636 FOR OP/ED): Performed by: INTERNAL MEDICINE

## 2024-03-07 RX ADMIN — BUSPIRONE HYDROCHLORIDE 10 MG: 5 TABLET ORAL at 04:28

## 2024-03-07 RX ADMIN — PANTOPRAZOLE SODIUM 40 MG: 40 TABLET, DELAYED RELEASE ORAL at 06:39

## 2024-03-07 RX ADMIN — METHYLPREDNISOLONE SODIUM SUCCINATE 40 MG: 40 INJECTION, POWDER, FOR SOLUTION INTRAMUSCULAR; INTRAVENOUS at 12:09

## 2024-03-07 RX ADMIN — METOPROLOL TARTRATE 25 MG: 25 TABLET, FILM COATED ORAL at 08:26

## 2024-03-07 RX ADMIN — ENOXAPARIN SODIUM 40 MG: 40 INJECTION SUBCUTANEOUS at 04:28

## 2024-03-07 RX ADMIN — ACETAMINOPHEN 650 MG: 325 TABLET ORAL at 22:33

## 2024-03-07 RX ADMIN — LISINOPRIL 40 MG: 20 TABLET ORAL at 08:26

## 2024-03-07 RX ADMIN — CLOPIDOGREL 75 MG: 75 TABLET ORAL at 08:26

## 2024-03-07 RX ADMIN — ASPIRIN 81 MG: 81 TABLET, COATED ORAL at 08:26

## 2024-03-07 RX ADMIN — METHYLPREDNISOLONE SODIUM SUCCINATE 40 MG: 40 INJECTION, POWDER, FOR SOLUTION INTRAMUSCULAR; INTRAVENOUS at 04:28

## 2024-03-07 RX ADMIN — METOPROLOL TARTRATE 25 MG: 25 TABLET, FILM COATED ORAL at 21:52

## 2024-03-07 RX ADMIN — BUSPIRONE HYDROCHLORIDE 10 MG: 5 TABLET ORAL at 15:02

## 2024-03-07 RX ADMIN — ACETAMINOPHEN 650 MG: 325 TABLET ORAL at 08:25

## 2024-03-07 RX ADMIN — ROSUVASTATIN CALCIUM 20 MG: 20 TABLET, FILM COATED ORAL at 08:26

## 2024-03-07 RX ADMIN — METHYLPREDNISOLONE SODIUM SUCCINATE 40 MG: 40 INJECTION, POWDER, FOR SOLUTION INTRAMUSCULAR; INTRAVENOUS at 21:52

## 2024-03-07 RX ADMIN — DOXYCYCLINE 100 MG: 100 CAPSULE ORAL at 08:26

## 2024-03-07 RX ADMIN — DOXYCYCLINE 100 MG: 100 CAPSULE ORAL at 21:52

## 2024-03-07 ASSESSMENT — COGNITIVE AND FUNCTIONAL STATUS - GENERAL
DRESSING REGULAR LOWER BODY CLOTHING: A LOT
TOILETING: A LITTLE
MOVING TO AND FROM BED TO CHAIR: A LITTLE
CLIMB 3 TO 5 STEPS WITH RAILING: A LITTLE
DRESSING REGULAR UPPER BODY CLOTHING: A LITTLE
MOBILITY SCORE: 18
TURNING FROM BACK TO SIDE WHILE IN FLAT BAD: A LITTLE
CLIMB 3 TO 5 STEPS WITH RAILING: A LITTLE
STANDING UP FROM CHAIR USING ARMS: A LITTLE
DAILY ACTIVITIY SCORE: 24
DAILY ACTIVITIY SCORE: 19
HELP NEEDED FOR BATHING: A LITTLE
MOBILITY SCORE: 23
WALKING IN HOSPITAL ROOM: A LITTLE
MOVING FROM LYING ON BACK TO SITTING ON SIDE OF FLAT BED WITH BEDRAILS: A LITTLE

## 2024-03-07 ASSESSMENT — PAIN SCALES - GENERAL
PAINLEVEL_OUTOF10: 1
PAINLEVEL_OUTOF10: 0 - NO PAIN
PAINLEVEL_OUTOF10: 3

## 2024-03-07 ASSESSMENT — PAIN DESCRIPTION - LOCATION: LOCATION: HEAD

## 2024-03-07 ASSESSMENT — PAIN - FUNCTIONAL ASSESSMENT
PAIN_FUNCTIONAL_ASSESSMENT: 0-10

## 2024-03-07 ASSESSMENT — PAIN DESCRIPTION - DESCRIPTORS: DESCRIPTORS: OTHER (COMMENT);ACHING

## 2024-03-07 NOTE — PROGRESS NOTES
03/07/24 1121   Discharge Planning   Living Arrangements Alone   Support Systems Friends/neighbors   Assistance Needed IADL's, transportation   Type of Residence Private residence   Number of Stairs to Enter Residence 6   Number of Stairs Within Residence 0   Home or Post Acute Services None   Patient expects to be discharged to: home   Does the patient need discharge transport arranged? No     Discharge planning assessment completed with patient. Patient resides in an apartment. She is ambulatory within her home, has a motorized scooter she uses when she is outside of her residence. Patient has friends in her building who assist her with housecleaning, meal prep, transport to appointments and errands. Patient is currently on supplemental oxygen, which is new for her. Patient states she was on home O2 several years ago but her tank got wet and broke, so she threw away the equipment. Patient is unsure who the supplying company was, says it was ordered for her under an old insurance policy. Anticipate a home O2 eval prior to discharge. Care Transitions team is following.

## 2024-03-07 NOTE — PROGRESS NOTES
Amy Montague is a 68 y.o. female on day 0 of admission presenting with COPD exacerbation (CMS/[facility]).      Subjective   Amy Montague is a 68 y.o. female with past medical history of coronary artery disease, hypertension, dyslipidemia, COPD presenting with cough and shortness of breath over the past 4 days..  Patient denies having any significant sputum production.  She is having a lot of wheezing.  Denies having any nausea vomiting or diarrhea.  Denies any dysuria frequency or urgency.  Patient has not been having any chest pain pleurisy or hemoptysis.  In the emergency room blood pressure 145/60 pulse of 67 respiratory rate of 18 room air pulse ox was 92% patient was placed on O2 as she supposedly desatted to 88% with ambulation.  Blood work shows CMP largely unremarkable patient is a BUN of 25 creatinine of 0.81.  CBC is unremarkable.  Venous gas shows pH of 7.38 with pCO2 of 55.  Influenza A influenza B RSV and coronavirus are all not detected.  Chest x-ray shows no acute cardiopulmonary process pulmonary nodules described from October 2022 CT are not well-evaluated on this film  Patient is being admitted to the hospital for COPD exacerbation.  Patient received Solu-Medrol in the emergency room and aerosol treatments without significant improvement in air movement at this time.  Patient has borderline oxygenation.  We will place her on a few liters nasal cannula for initial response until she opens up from the beta agonist and steroids.    03/07: Patient was evaluated this morning, shortness of breath is improving, no fever or chills, no nausea or vomiting.       Objective     Last Recorded Vitals  /65 (BP Location: Right arm, Patient Position: Lying)   Pulse 63   Temp 36.2 °C (97.2 °F) (Temporal)   Resp 16   Wt 68 kg (150 lb)   SpO2 95%   Intake/Output last 3 Shifts:    Intake/Output Summary (Last 24 hours) at 3/7/2024 1546  Last data filed at 3/7/2024 0924  Gross per 24 hour   Intake 2175 ml    Output --   Net 2175 ml       Admission Weight  Weight: 68 kg (150 lb) (03/05/24 3558)    Daily Weight  03/05/24 : 68 kg (150 lb)    Image Results  ECG 12 lead  Sinus rhythm  Borderline intraventricular conduction delay  Low voltage, precordial leads  Borderline prolonged QT interval  XR chest 1 view  Narrative: Interpreted By:  Casa Ramon,   STUDY:  XR CHEST 1 VIEW;  3/6/2024 12:13 am      INDICATION:  Signs/Symptoms:SOB.      COMPARISON:  10/03/2022 CT chest      ACCESSION NUMBER(S):  [id number]      ORDERING CLINICIAN:  DAVID ROMERO      FINDINGS:          No consolidation, pleural effusion, or pneumothorax. Heart size is  normal. No acute osseous abnormality. Upper abdomen and superficial  soft tissues are unremarkable.      Impression: 1. No acute cardiopulmonary process.  2. Pulmonary nodules described on the 10/03/2022 CT chest are not  well evaluated radiographically. Continued CT screening is  recommended.      Signed by: Casa Ramon 3/6/2024 12:45 AM  Dictation workstation:   [id number]      Physical Exam  Patient is awake and orient, not in apparent distress  Eyes: PERRLA, no conjunctival congestion  Chest: Bilateral decreased air entry, occasional expiratory wheezing, no crackles.  Heart: s1S2 regular, no murmur  Abdomen: Soft, non tender, BS present  Ext:    Relevant Results               Assessment/Plan      1.  Acute hypoxic respiratory failure  Secondary to COPD exacerbation  Continue oxygen supplementation and wean as tolerated  Patient not on oxygen at baseline    2.  COPD exacerbation  Continue on IV steroid, DuoNeb treatment as well as  Doxycycline  Monitor    3.  CAD/hypertension  Stable  Continue home medication    4.  Prediabetes  Hyperglycemia with A1c of 6.4 suggestive of prediabetes  Educated on diet and exercise    5.  Active smoker  Patient was counseled against smoking  Nicotine replacement therapy offered            Chiqui Crane MD

## 2024-03-07 NOTE — CARE PLAN
The patient's goals for the shift include    Problem: Pain  Goal: My pain/discomfort is manageable  Outcome: Progressing  Flowsheets (Taken 3/7/2024 0714)  Resident's pain/discomfort is manageable:   Identify and avoid pain triggers   Administer pain medication prior to activities that may trigger pain   Offer non-pharmacological pain management interventions     Problem: Safety  Goal: Patient will be injury free during hospitalization  Outcome: Progressing  Goal: I will remain free of falls  Outcome: Progressing     Problem: Daily Care  Goal: Daily care needs are met  Outcome: Progressing     Problem: Psychosocial Needs  Goal: Demonstrates ability to cope with hospitalization/illness  Outcome: Progressing  Goal: Collaborate with me, my family, and caregiver to identify my specific goals  Outcome: Progressing     Problem: Discharge Barriers  Goal: My discharge needs are met  Outcome: Progressing     Problem: Skin  Goal: Decreased wound size/increased tissue granulation at next dressing change  Outcome: Progressing  Flowsheets (Taken 3/7/2024 0714)  Decreased wound size/increased tissue granulation at next dressing change: Promote sleep for wound healing  Goal: Participates in plan/prevention/treatment measures  Outcome: Progressing  Flowsheets (Taken 3/7/2024 0714)  Participates in plan/prevention/treatment measures:   Increase activity/out of bed for meals   Elevate heels  Goal: Prevent/manage excess moisture  Outcome: Progressing  Flowsheets (Taken 3/7/2024 0714)  Prevent/manage excess moisture: Monitor for/manage infection if present  Goal: Prevent/minimize sheer/friction injuries  Outcome: Progressing  Flowsheets (Taken 3/7/2024 0714)  Prevent/minimize sheer/friction injuries:   Turn/reposition every 2 hours/use positioning/transfer devices   Complete micro-shifts as needed if patient unable. Adjust patient position to relieve pressure points, not a full turn   Increase activity/out of bed for meals  Goal:  Promote/optimize nutrition  Outcome: Progressing  Flowsheets (Taken 3/7/2024 0714)  Promote/optimize nutrition: Monitor/record intake including meals  Goal: Promote skin healing  Outcome: Progressing  Flowsheets (Taken 3/7/2024 0714)  Promote skin healing:   Turn/reposition every 2 hours/use positioning/transfer devices   Assess skin/pad under line(s)/device(s)     Problem: Respiratory  Goal: Clear secretions with interventions this shift  Outcome: Progressing  Goal: Minimize anxiety/maximize coping throughout shift  Outcome: Progressing  Goal: Minimal/no exertional discomfort or dyspnea this shift  Outcome: Progressing  Goal: No signs of respiratory distress (eg. Use of accessory muscles. Peds grunting)  Outcome: Progressing  Goal: Patent airway maintained this shift  Outcome: Progressing  Goal: Verbalize decreased shortness of breath this shift  Outcome: Progressing  Goal: Wean oxygen to maintain O2 saturation per order/standard this shift  Outcome: Progressing  Goal: Increase self care and/or family involvement in next 24 hours  Outcome: Progressing

## 2024-03-08 PROCEDURE — 94645 CONT INHLJ TX EACH ADDL HOUR: CPT

## 2024-03-08 PROCEDURE — 2500000001 HC RX 250 WO HCPCS SELF ADMINISTERED DRUGS (ALT 637 FOR MEDICARE OP): Performed by: INTERNAL MEDICINE

## 2024-03-08 PROCEDURE — 2500000002 HC RX 250 W HCPCS SELF ADMINISTERED DRUGS (ALT 637 FOR MEDICARE OP, ALT 636 FOR OP/ED): Performed by: INTERNAL MEDICINE

## 2024-03-08 PROCEDURE — 2500000004 HC RX 250 GENERAL PHARMACY W/ HCPCS (ALT 636 FOR OP/ED): Performed by: INTERNAL MEDICINE

## 2024-03-08 PROCEDURE — 94644 CONT INHLJ TX 1ST HOUR: CPT

## 2024-03-08 PROCEDURE — 1200000002 HC GENERAL ROOM WITH TELEMETRY DAILY

## 2024-03-08 PROCEDURE — 99233 SBSQ HOSP IP/OBS HIGH 50: CPT | Performed by: INTERNAL MEDICINE

## 2024-03-08 RX ADMIN — TIOTROPIUM BROMIDE INHALATION SPRAY 2 PUFF: 3.12 SPRAY, METERED RESPIRATORY (INHALATION) at 06:32

## 2024-03-08 RX ADMIN — DOXYCYCLINE 100 MG: 100 CAPSULE ORAL at 09:27

## 2024-03-08 RX ADMIN — METOPROLOL TARTRATE 25 MG: 25 TABLET, FILM COATED ORAL at 21:21

## 2024-03-08 RX ADMIN — ROSUVASTATIN CALCIUM 20 MG: 20 TABLET, FILM COATED ORAL at 09:27

## 2024-03-08 RX ADMIN — PANTOPRAZOLE SODIUM 40 MG: 40 TABLET, DELAYED RELEASE ORAL at 06:32

## 2024-03-08 RX ADMIN — METHYLPREDNISOLONE SODIUM SUCCINATE 40 MG: 40 INJECTION, POWDER, FOR SOLUTION INTRAMUSCULAR; INTRAVENOUS at 12:03

## 2024-03-08 RX ADMIN — BUSPIRONE HYDROCHLORIDE 10 MG: 5 TABLET ORAL at 03:21

## 2024-03-08 RX ADMIN — ACETAMINOPHEN 650 MG: 325 TABLET ORAL at 21:21

## 2024-03-08 RX ADMIN — ENOXAPARIN SODIUM 40 MG: 40 INJECTION SUBCUTANEOUS at 03:21

## 2024-03-08 RX ADMIN — METHYLPREDNISOLONE SODIUM SUCCINATE 40 MG: 40 INJECTION, POWDER, FOR SOLUTION INTRAMUSCULAR; INTRAVENOUS at 21:21

## 2024-03-08 RX ADMIN — METHYLPREDNISOLONE SODIUM SUCCINATE 40 MG: 40 INJECTION, POWDER, FOR SOLUTION INTRAMUSCULAR; INTRAVENOUS at 03:21

## 2024-03-08 RX ADMIN — LISINOPRIL 40 MG: 20 TABLET ORAL at 09:27

## 2024-03-08 RX ADMIN — DOXYCYCLINE 100 MG: 100 CAPSULE ORAL at 21:21

## 2024-03-08 RX ADMIN — ASPIRIN 81 MG: 81 TABLET, COATED ORAL at 09:27

## 2024-03-08 RX ADMIN — CLOPIDOGREL 75 MG: 75 TABLET ORAL at 09:27

## 2024-03-08 RX ADMIN — BUSPIRONE HYDROCHLORIDE 10 MG: 5 TABLET ORAL at 15:03

## 2024-03-08 RX ADMIN — METOPROLOL TARTRATE 25 MG: 25 TABLET, FILM COATED ORAL at 09:27

## 2024-03-08 ASSESSMENT — COGNITIVE AND FUNCTIONAL STATUS - GENERAL
MOBILITY SCORE: 21
DAILY ACTIVITIY SCORE: 24
DAILY ACTIVITIY SCORE: 23
MOBILITY SCORE: 24
DRESSING REGULAR LOWER BODY CLOTHING: A LITTLE
WALKING IN HOSPITAL ROOM: A LITTLE
CLIMB 3 TO 5 STEPS WITH RAILING: A LITTLE
STANDING UP FROM CHAIR USING ARMS: A LITTLE

## 2024-03-08 ASSESSMENT — PAIN SCALES - GENERAL: PAINLEVEL_OUTOF10: 0 - NO PAIN

## 2024-03-08 NOTE — CARE PLAN
The patient's goals for the shift include      The clinical goals for the shift include Patients o2 will be >92% throughout this shift.    Problem: Pain  Goal: My pain/discomfort is manageable  Outcome: Progressing     Problem: Safety  Goal: Patient will be injury free during hospitalization  Outcome: Progressing  Goal: I will remain free of falls  Outcome: Progressing     Problem: Daily Care  Goal: Daily care needs are met  Outcome: Progressing     Problem: Psychosocial Needs  Goal: Demonstrates ability to cope with hospitalization/illness  Outcome: Progressing  Goal: Collaborate with me, my family, and caregiver to identify my specific goals  Outcome: Progressing     Problem: Discharge Barriers  Goal: My discharge needs are met  Outcome: Progressing     Problem: Skin  Goal: Decreased wound size/increased tissue granulation at next dressing change  Outcome: Progressing  Flowsheets (Taken 3/8/2024 0750)  Decreased wound size/increased tissue granulation at next dressing change: Protective dressings over bony prominences  Goal: Participates in plan/prevention/treatment measures  Outcome: Progressing  Flowsheets (Taken 3/8/2024 0750)  Participates in plan/prevention/treatment measures:   Elevate heels   Increase activity/out of bed for meals  Goal: Prevent/manage excess moisture  Outcome: Progressing  Flowsheets (Taken 3/8/2024 0750)  Prevent/manage excess moisture:   Cleanse incontinence/protect with barrier cream   Use wicking fabric (obtain order)   Moisturize dry skin  Goal: Prevent/minimize sheer/friction injuries  Outcome: Progressing  Flowsheets (Taken 3/8/2024 0750)  Prevent/minimize sheer/friction injuries:   Turn/reposition every 2 hours/use positioning/transfer devices   Use pull sheet  Goal: Promote/optimize nutrition  Outcome: Progressing  Flowsheets (Taken 3/8/2024 0750)  Promote/optimize nutrition:   Assist with feeding   Consume > 50% meals/supplements  Goal: Promote skin healing  Outcome:  Progressing  Flowsheets (Taken 3/8/2024 3350)  Promote skin healing:   Protective dressings over bony prominences   Turn/reposition every 2 hours/use positioning/transfer devices     Problem: Respiratory  Goal: Clear secretions with interventions this shift  Outcome: Progressing  Goal: Minimize anxiety/maximize coping throughout shift  Outcome: Progressing  Goal: Minimal/no exertional discomfort or dyspnea this shift  Outcome: Progressing  Goal: No signs of respiratory distress (eg. Use of accessory muscles. Peds grunting)  Outcome: Progressing  Goal: Patent airway maintained this shift  Outcome: Progressing  Goal: Verbalize decreased shortness of breath this shift  Outcome: Progressing  Goal: Wean oxygen to maintain O2 saturation per order/standard this shift  Outcome: Progressing  Goal: Increase self care and/or family involvement in next 24 hours  Outcome: Progressing

## 2024-03-08 NOTE — PROGRESS NOTES
Amy Montague is a 68 y.o. female on day 1 of admission presenting with COPD exacerbation (CMS/[facility]).      Subjective   Amy Montague is a 68 y.o. female with past medical history of coronary artery disease, hypertension, dyslipidemia, COPD presenting with cough and shortness of breath over the past 4 days..  Patient denies having any significant sputum production.  She is having a lot of wheezing.  Denies having any nausea vomiting or diarrhea.  Denies any dysuria frequency or urgency.  Patient has not been having any chest pain pleurisy or hemoptysis.  In the emergency room blood pressure 145/60 pulse of 67 respiratory rate of 18 room air pulse ox was 92% patient was placed on O2 as she supposedly desatted to 88% with ambulation.  Blood work shows CMP largely unremarkable patient is a BUN of 25 creatinine of 0.81.  CBC is unremarkable.  Venous gas shows pH of 7.38 with pCO2 of 55.  Influenza A influenza B RSV and coronavirus are all not detected.  Chest x-ray shows no acute cardiopulmonary process pulmonary nodules described from October 2022 CT are not well-evaluated on this film  Patient is being admitted to the hospital for COPD exacerbation.  Patient received Solu-Medrol in the emergency room and aerosol treatments without significant improvement in air movement at this time.  Patient has borderline oxygenation.  We will place her on a few liters nasal cannula for initial response until she opens up from the beta agonist and steroids.    03/07: Patient was evaluated this morning, shortness of breath is improving, no fever or chills, no nausea or vomiting.     02/08: Patient was evaluated this morning, feeling a lot better, cough and shortness of breath improving.    Objective     Last Recorded Vitals  /55 (BP Location: Right arm, Patient Position: Sitting)   Pulse 57   Temp 36.6 °C (97.9 °F) (Temporal)   Resp 18   Wt 68 kg (150 lb)   SpO2 94%   Intake/Output last 3 Shifts:    Intake/Output  Summary (Last 24 hours) at 3/8/2024 1423  Last data filed at 3/8/2024 0832  Gross per 24 hour   Intake 490 ml   Output --   Net 490 ml         Admission Weight  Weight: 68 kg (150 lb) (03/05/24 2348)    Daily Weight  03/05/24 : 68 kg (150 lb)    Image Results  ECG 12 lead  Sinus rhythm  Borderline intraventricular conduction delay  Low voltage, precordial leads  Borderline prolonged QT interval  XR chest 1 view  Narrative: Interpreted By:  Casa Ramon,   STUDY:  XR CHEST 1 VIEW;  3/6/2024 12:13 am      INDICATION:  Signs/Symptoms:SOB.      COMPARISON:  10/03/2022 CT chest      ACCESSION NUMBER(S):  [id number]      ORDERING CLINICIAN:  DAVID ROMERO      FINDINGS:          No consolidation, pleural effusion, or pneumothorax. Heart size is  normal. No acute osseous abnormality. Upper abdomen and superficial  soft tissues are unremarkable.      Impression: 1. No acute cardiopulmonary process.  2. Pulmonary nodules described on the 10/03/2022 CT chest are not  well evaluated radiographically. Continued CT screening is  recommended.      Signed by: Casa Ramon 3/6/2024 12:45 AM  Dictation workstation:   [id number]      Physical Exam  Patient is awake and orient, not in apparent distress  Eyes: PERRLA, no conjunctival congestion  Chest: Bilateral decreased air entry, occasional expiratory wheezing, no crackles.  Heart: s1S2 regular, no murmur  Abdomen: Soft, non tender, BS present  Ext:    Relevant Results               Assessment/Plan      1.  Acute hypoxic respiratory failure  Secondary to COPD exacerbation  Continue oxygen supplementation and wean as tolerated  Patient not on oxygen at baseline    2.  COPD exacerbation  Continue on IV steroid, DuoNeb treatment as well as  Doxycycline  Monitor    3.  CAD/hypertension  Stable  Continue home medication    4.  Prediabetes  Hyperglycemia with A1c of 6.4 suggestive of prediabetes  Educated on diet and exercise    5.  Active smoker  Patient was counseled  against smoking  Nicotine replacement therapy offered            Chiqui Crane MD

## 2024-03-09 VITALS
RESPIRATION RATE: 18 BRPM | BODY MASS INDEX: 29.45 KG/M2 | HEART RATE: 59 BPM | DIASTOLIC BLOOD PRESSURE: 52 MMHG | WEIGHT: 150 LBS | SYSTOLIC BLOOD PRESSURE: 167 MMHG | OXYGEN SATURATION: 90 % | HEIGHT: 60 IN | TEMPERATURE: 97.6 F

## 2024-03-09 PROBLEM — J44.1 COPD EXACERBATION (MULTI): Status: RESOLVED | Noted: 2024-03-06 | Resolved: 2024-03-09

## 2024-03-09 LAB
BACTERIA SPEC RESP CULT: ABNORMAL
BACTERIA SPEC RESP CULT: ABNORMAL
GRAM STN SPEC: ABNORMAL
GRAM STN SPEC: ABNORMAL

## 2024-03-09 PROCEDURE — 99239 HOSP IP/OBS DSCHRG MGMT >30: CPT | Performed by: INTERNAL MEDICINE

## 2024-03-09 PROCEDURE — 2500000004 HC RX 250 GENERAL PHARMACY W/ HCPCS (ALT 636 FOR OP/ED): Performed by: INTERNAL MEDICINE

## 2024-03-09 PROCEDURE — 2500000005 HC RX 250 GENERAL PHARMACY W/O HCPCS: Performed by: INTERNAL MEDICINE

## 2024-03-09 PROCEDURE — 94644 CONT INHLJ TX 1ST HOUR: CPT

## 2024-03-09 PROCEDURE — 2500000001 HC RX 250 WO HCPCS SELF ADMINISTERED DRUGS (ALT 637 FOR MEDICARE OP): Performed by: INTERNAL MEDICINE

## 2024-03-09 PROCEDURE — 94645 CONT INHLJ TX EACH ADDL HOUR: CPT

## 2024-03-09 PROCEDURE — 2500000002 HC RX 250 W HCPCS SELF ADMINISTERED DRUGS (ALT 637 FOR MEDICARE OP, ALT 636 FOR OP/ED): Performed by: INTERNAL MEDICINE

## 2024-03-09 RX ORDER — FLUTICASONE PROPIONATE AND SALMETEROL 250; 50 UG/1; UG/1
1 POWDER RESPIRATORY (INHALATION)
Qty: 1 EACH | Refills: 0 | Status: SHIPPED | OUTPATIENT
Start: 2024-03-09

## 2024-03-09 RX ORDER — ALBUTEROL SULFATE 90 UG/1
2 AEROSOL, METERED RESPIRATORY (INHALATION) EVERY 4 HOURS PRN
Qty: 8.5 G | Refills: 0 | Status: SHIPPED | OUTPATIENT
Start: 2024-03-09

## 2024-03-09 RX ORDER — PREDNISONE 20 MG/1
TABLET ORAL
Qty: 20 TABLET | Refills: 0 | Status: SHIPPED | OUTPATIENT
Start: 2024-03-09 | End: 2024-03-20

## 2024-03-09 RX ORDER — DOXYCYCLINE 100 MG/1
100 CAPSULE ORAL 2 TIMES DAILY
Qty: 3 CAPSULE | Refills: 0 | Status: SHIPPED | OUTPATIENT
Start: 2024-03-09 | End: 2024-03-11

## 2024-03-09 RX ADMIN — TIOTROPIUM BROMIDE INHALATION SPRAY 2 PUFF: 3.12 SPRAY, METERED RESPIRATORY (INHALATION) at 06:34

## 2024-03-09 RX ADMIN — ASPIRIN 81 MG: 81 TABLET, COATED ORAL at 09:42

## 2024-03-09 RX ADMIN — ROSUVASTATIN CALCIUM 20 MG: 20 TABLET, FILM COATED ORAL at 09:42

## 2024-03-09 RX ADMIN — LISINOPRIL 40 MG: 20 TABLET ORAL at 09:42

## 2024-03-09 RX ADMIN — CLOPIDOGREL 75 MG: 75 TABLET ORAL at 09:42

## 2024-03-09 RX ADMIN — ENOXAPARIN SODIUM 40 MG: 40 INJECTION SUBCUTANEOUS at 03:12

## 2024-03-09 RX ADMIN — METOPROLOL TARTRATE 25 MG: 25 TABLET, FILM COATED ORAL at 09:42

## 2024-03-09 RX ADMIN — DOXYCYCLINE 100 MG: 100 CAPSULE ORAL at 09:42

## 2024-03-09 RX ADMIN — PANTOPRAZOLE SODIUM 40 MG: 40 TABLET, DELAYED RELEASE ORAL at 06:34

## 2024-03-09 RX ADMIN — BUSPIRONE HYDROCHLORIDE 10 MG: 5 TABLET ORAL at 03:12

## 2024-03-09 RX ADMIN — Medication: at 08:10

## 2024-03-09 RX ADMIN — METHYLPREDNISOLONE SODIUM SUCCINATE 40 MG: 40 INJECTION, POWDER, FOR SOLUTION INTRAMUSCULAR; INTRAVENOUS at 03:12

## 2024-03-09 ASSESSMENT — COGNITIVE AND FUNCTIONAL STATUS - GENERAL
DRESSING REGULAR LOWER BODY CLOTHING: A LITTLE
WALKING IN HOSPITAL ROOM: A LITTLE
DAILY ACTIVITIY SCORE: 23
DRESSING REGULAR LOWER BODY CLOTHING: A LITTLE
DAILY ACTIVITIY SCORE: 23
MOBILITY SCORE: 23
CLIMB 3 TO 5 STEPS WITH RAILING: A LITTLE
CLIMB 3 TO 5 STEPS WITH RAILING: A LITTLE
STANDING UP FROM CHAIR USING ARMS: A LITTLE
MOBILITY SCORE: 21

## 2024-03-09 ASSESSMENT — PAIN SCALES - GENERAL: PAINLEVEL_OUTOF10: 0 - NO PAIN

## 2024-03-09 NOTE — DISCHARGE SUMMARY
Discharge Diagnosis  COPD exacerbation (CMS/[facility])  Acute hypoxic respiratory failure secondary to COPD exacerbation      This discharge took greater than 35 minutes.    Test Results Pending At Discharge  Pending Labs       Order Current Status    Respiratory Culture/Smear Preliminary result            Hospital Course   Patient presented with shortness of breath and cough.  She was admitted with a diagnosis of acute hypoxic respiratory failure secondary to COPD exacerbation.  Started on oxygen supplementation, IV steroid as well as doxycycline.  Patient condition significantly improved in couple of days with treatment.  She was maintaining saturation on room air this morning and felt a lot better.  She wanted to go home.  Patient will be discharged home on oral prednisone and tapering dose, doxycycline to complete the course.  She will be also discharged on albuterol inhaler as needed as well as Advair twice daily.  She will follow-up with primary care provider as an outpatient.  She was instructed to quit smoking.    Pertinent Physical Exam At Time of Discharge  Physical Exam  Patient is awake and orient, not in apparent distress  Eyes: PERRLA, no conjunctival congestion  Chest: Bilateral Air entry, no crackles or wheezing  Heart: s1S2 regular, no murmur  Abdomen: Soft, non tender, BS present  Ext:    Home Medications     Medication List      START taking these medications     doxycycline 100 mg capsule; Commonly known as: Vibramycin; Take 1   capsule (100 mg) by mouth 2 times a day for 3 doses. Take with at least 8   ounces (large glass) of water, do not lie down for 30 minutes after   fluticasone propion-salmeteroL 250-50 mcg/dose diskus inhaler; Commonly   known as: Advair Diskus; Inhale 1 puff 2 times a day. Rinse mouth with   water after use to reduce aftertaste and incidence of candidiasis. Do not   swallow.   predniSONE 20 mg tablet; Commonly known as: Deltasone; Take 3 tablets   (60 mg) by mouth once daily  for 3 days, THEN 2 tablets (40 mg) once daily   for 3 days, THEN 1 tablet (20 mg) once daily for 3 days, THEN 0.5 tablets   (10 mg) once daily for 3 days.; Start taking on: March 9, 2024     CHANGE how you take these medications     * albuterol 0.63 mg/3 mL nebulizer solution; What changed: Another   medication with the same name was added. Make sure you understand how and   when to take each.   * ProAir RespiClick 90 mcg/actuation aerosol powdr breath activated   inhaler; Generic drug: albuterol; What changed: Another medication with   the same name was added. Make sure you understand how and when to take   each.   * albuterol 90 mcg/actuation inhaler; Commonly known as: ProAir HFA;   Inhale 2 puffs every 4 hours if needed for wheezing or shortness of   breath.; What changed: You were already taking a medication with the same   name, and this prescription was added. Make sure you understand how and   when to take each.  * This list has 3 medication(s) that are the same as other medications   prescribed for you. Read the directions carefully, and ask your doctor or   other care provider to review them with you.     CONTINUE taking these medications     aspirin 81 mg EC tablet   busPIRone 10 mg tablet; Commonly known as: Buspar   clonazePAM 1 mg tablet; Commonly known as: KlonoPIN   clopidogrel 75 mg tablet; Commonly known as: Plavix   FLUoxetine 40 mg capsule; Commonly known as: PROzac   hydroCHLOROthiazide 25 mg tablet; Commonly known as: HYDRODiuril   lisinopril 40 mg tablet   metoprolol tartrate 25 mg tablet; Commonly known as: Lopressor   omeprazole 40 mg DR capsule; Commonly known as: PriLOSEC   oxygen gas therapy; Commonly known as: O2   rosuvastatin 20 mg tablet; Commonly known as: Crestor   Spiriva with HandiHaler 18 mcg inhalation capsule; Generic drug:   tiotropium   traZODone 50 mg tablet; Commonly known as: Desyrel   Tylenol Extra Strength 500 mg tablet; Generic drug: acetaminophen   ZyrTEC 10 mg tablet;  Generic drug: cetirizine       Outpatient Follow-Up  No follow-ups on file.     Chiqui Crane MD  3/9/2024  10:50 AM

## 2024-03-09 NOTE — NURSING NOTE
Ambulated pt oxygen maintained 90% after walking 100 ft, Dr. Crane notified, pt being discharged, reviewed discharge paper work with pt IV removed, ride is here. Pt has no further requests at this time.

## 2024-03-11 ENCOUNTER — PATIENT OUTREACH (OUTPATIENT)
Dept: CARE COORDINATION | Facility: CLINIC | Age: 69
End: 2024-03-11
Payer: MEDICARE

## 2024-03-11 NOTE — PROGRESS NOTES
Admit to [place] 3/5 to 3/9 with acute hypoxic respiratory failure secondary to COPD exacerbation.   Weaned to room air.   Discharged on Doxycycline and Prednisone taper.       Attempted transition of care outreach; left voice mail message.  Enrolled in [employer] chat to monitor for 30 day transition period and will outreach if issues arise    Mayelin MARIE RN CCM  RN Care Coordinator  [facility]  Phone [phone]

## 2024-03-16 LAB
ATRIAL RATE: 69 BPM
P AXIS: 79 DEGREES
PR INTERVAL: 180 MS
Q ONSET: 253 MS
QRS COUNT: 11 BEATS
QRS DURATION: 114 MS
QT INTERVAL: 454 MS
QTC CALCULATION(BAZETT): 487 MS
QTC FREDERICIA: 475 MS
R AXIS: 62 DEGREES
T AXIS: 37 DEGREES
T OFFSET: 480 MS
VENTRICULAR RATE: 69 BPM

## 2024-03-18 ENCOUNTER — PATIENT OUTREACH (OUTPATIENT)
Dept: CARE COORDINATION | Facility: CLINIC | Age: 69
End: 2024-03-18
Payer: MEDICARE

## 2024-03-18 NOTE — PROGRESS NOTES
Attempted hospital follow up; left voice mail message.   Per chart, no post hospital provider appts or progress notes imaged.   Will continue to  monitor for 30 day transition period and outreach if issues arise.    Mayelin MARIE RN CCM  RN Care Coordinator  [facility]  Phone [phone]

## 2024-03-31 DIAGNOSIS — J44.1 COPD EXACERBATION (MULTI): ICD-10-CM

## 2024-05-06 RX ORDER — ALBUTEROL SULFATE 90 UG/1
2 AEROSOL, METERED RESPIRATORY (INHALATION) EVERY 4 HOURS PRN
OUTPATIENT
Start: 2024-05-06

## 2024-05-06 NOTE — TELEPHONE ENCOUNTER
Sent to me by mistake. This patient has never been seen in our office/is not a patient of our practice.   Miryam Ortega, DO

## 2024-07-29 ENCOUNTER — APPOINTMENT (OUTPATIENT)
Dept: CARDIOLOGY | Facility: HOSPITAL | Age: 69
End: 2024-07-29
Payer: MEDICARE

## 2024-07-29 ENCOUNTER — APPOINTMENT (OUTPATIENT)
Dept: RADIOLOGY | Facility: HOSPITAL | Age: 69
End: 2024-07-29
Payer: MEDICARE

## 2024-07-29 ENCOUNTER — HOSPITAL ENCOUNTER (INPATIENT)
Facility: HOSPITAL | Age: 69
LOS: 2 days | Discharge: HOME HEALTH | End: 2024-08-01
Admitting: INTERNAL MEDICINE
Payer: MEDICARE

## 2024-07-29 DIAGNOSIS — R53.81 PHYSICAL DECONDITIONING: ICD-10-CM

## 2024-07-29 DIAGNOSIS — I67.1 INTRACRANIAL ANEURYSM (HHS-HCC): ICD-10-CM

## 2024-07-29 DIAGNOSIS — R55 SYNCOPE AND COLLAPSE: Primary | ICD-10-CM

## 2024-07-29 DIAGNOSIS — I10 ESSENTIAL HYPERTENSION: ICD-10-CM
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
## 2024-07-29 DIAGNOSIS — R06.09 DOE (DYSPNEA ON EXERTION): ICD-10-CM

## 2024-07-29 DIAGNOSIS — S09.90XA HEAD INJURY, INITIAL ENCOUNTER: ICD-10-CM

## 2024-07-29 PROBLEM — F41.8 MIXED ANXIETY DEPRESSIVE DISORDER: Status: RESOLVED | Noted: 2022-02-23 | Resolved: 2024-07-29

## 2024-07-29 PROBLEM — E87.6 HYPOKALEMIA: Status: ACTIVE | Noted: 2024-07-29

## 2024-07-29 PROBLEM — I67.83 POSTERIOR REVERSIBLE ENCEPHALOPATHY SYNDROME: Status: RESOLVED | Noted: 2021-12-11 | Resolved: 2024-07-29

## 2024-07-29 LAB
ALBUMIN SERPL BCP-MCNC: 3.9 G/DL (ref 3.4–5)
ALP SERPL-CCNC: 61 U/L (ref 33–136)
ALT SERPL W P-5'-P-CCNC: 16 U/L (ref 7–45)
ANION GAP SERPL CALC-SCNC: 10 MMOL/L (ref 10–20)
AST SERPL W P-5'-P-CCNC: 15 U/L (ref 9–39)
BASOPHILS # BLD AUTO: 0.03 X10*3/UL (ref 0–0.1)
BASOPHILS NFR BLD AUTO: 0.4 %
BILIRUB SERPL-MCNC: 0.3 MG/DL (ref 0–1.2)
BUN SERPL-MCNC: 17 MG/DL (ref 6–23)
CALCIUM SERPL-MCNC: 8.9 MG/DL (ref 8.6–10.3)
CARDIAC TROPONIN I PNL SERPL HS: 3 NG/L (ref 0–13)
CHLORIDE SERPL-SCNC: 99 MMOL/L (ref 98–107)
CO2 SERPL-SCNC: 32 MMOL/L (ref 21–32)
CREAT SERPL-MCNC: 0.82 MG/DL (ref 0.5–1.05)
EGFRCR SERPLBLD CKD-EPI 2021: 78 ML/MIN/1.73M*2
EOSINOPHIL # BLD AUTO: 0.1 X10*3/UL (ref 0–0.7)
EOSINOPHIL NFR BLD AUTO: 1.4 %
ERYTHROCYTE [DISTWIDTH] IN BLOOD BY AUTOMATED COUNT: 12.5 % (ref 11.5–14.5)
GLUCOSE SERPL-MCNC: 118 MG/DL (ref 74–99)
HCT VFR BLD AUTO: 37.7 % (ref 36–46)
HGB BLD-MCNC: 12.5 G/DL (ref 12–16)
IMM GRANULOCYTES # BLD AUTO: 0.02 X10*3/UL (ref 0–0.7)
IMM GRANULOCYTES NFR BLD AUTO: 0.3 % (ref 0–0.9)
LIPASE SERPL-CCNC: 32 U/L (ref 9–82)
LYMPHOCYTES # BLD AUTO: 1.58 X10*3/UL (ref 1.2–4.8)
LYMPHOCYTES NFR BLD AUTO: 22.3 %
MAGNESIUM SERPL-MCNC: 1.87 MG/DL (ref 1.6–2.4)
MCH RBC QN AUTO: 30.2 PG (ref 26–34)
MCHC RBC AUTO-ENTMCNC: 33.2 G/DL (ref 32–36)
MCV RBC AUTO: 91 FL (ref 80–100)
MONOCYTES # BLD AUTO: 0.45 X10*3/UL (ref 0.1–1)
MONOCYTES NFR BLD AUTO: 6.4 %
NEUTROPHILS # BLD AUTO: 4.9 X10*3/UL (ref 1.2–7.7)
NEUTROPHILS NFR BLD AUTO: 69.2 %
NRBC BLD-RTO: 0 /100 WBCS (ref 0–0)
PLATELET # BLD AUTO: 146 X10*3/UL (ref 150–450)
POTASSIUM SERPL-SCNC: 2.9 MMOL/L (ref 3.5–5.3)
PROT SERPL-MCNC: 6.5 G/DL (ref 6.4–8.2)
RBC # BLD AUTO: 4.14 X10*6/UL (ref 4–5.2)
SODIUM SERPL-SCNC: 138 MMOL/L (ref 136–145)
WBC # BLD AUTO: 7.1 X10*3/UL (ref 4.4–11.3)

## 2024-07-29 PROCEDURE — 96374 THER/PROPH/DIAG INJ IV PUSH: CPT

## 2024-07-29 PROCEDURE — 36415 COLL VENOUS BLD VENIPUNCTURE: CPT

## 2024-07-29 PROCEDURE — 85025 COMPLETE CBC W/AUTO DIFF WBC: CPT

## 2024-07-29 PROCEDURE — 74177 CT ABD & PELVIS W/CONTRAST: CPT

## 2024-07-29 PROCEDURE — 2500000001 HC RX 250 WO HCPCS SELF ADMINISTERED DRUGS (ALT 637 FOR MEDICARE OP)

## 2024-07-29 PROCEDURE — 2500000004 HC RX 250 GENERAL PHARMACY W/ HCPCS (ALT 636 FOR OP/ED)

## 2024-07-29 PROCEDURE — 73502 X-RAY EXAM HIP UNI 2-3 VIEWS: CPT | Mod: LEFT SIDE | Performed by: RADIOLOGY

## 2024-07-29 PROCEDURE — 74177 CT ABD & PELVIS W/CONTRAST: CPT | Mod: FOREIGN READ | Performed by: RADIOLOGY

## 2024-07-29 PROCEDURE — 84484 ASSAY OF TROPONIN QUANT: CPT

## 2024-07-29 PROCEDURE — 96375 TX/PRO/DX INJ NEW DRUG ADDON: CPT

## 2024-07-29 PROCEDURE — 80053 COMPREHEN METABOLIC PANEL: CPT

## 2024-07-29 PROCEDURE — 72125 CT NECK SPINE W/O DYE: CPT

## 2024-07-29 PROCEDURE — 72125 CT NECK SPINE W/O DYE: CPT | Performed by: STUDENT IN AN ORGANIZED HEALTH CARE EDUCATION/TRAINING PROGRAM

## 2024-07-29 PROCEDURE — 2550000001 HC RX 255 CONTRASTS

## 2024-07-29 PROCEDURE — 71046 X-RAY EXAM CHEST 2 VIEWS: CPT | Mod: FOREIGN READ | Performed by: RADIOLOGY

## 2024-07-29 PROCEDURE — 83735 ASSAY OF MAGNESIUM: CPT

## 2024-07-29 PROCEDURE — 99223 1ST HOSP IP/OBS HIGH 75: CPT | Performed by: STUDENT IN AN ORGANIZED HEALTH CARE EDUCATION/TRAINING PROGRAM

## 2024-07-29 PROCEDURE — 99285 EMERGENCY DEPT VISIT HI MDM: CPT | Mod: 25

## 2024-07-29 PROCEDURE — 83690 ASSAY OF LIPASE: CPT

## 2024-07-29 PROCEDURE — 71046 X-RAY EXAM CHEST 2 VIEWS: CPT

## 2024-07-29 PROCEDURE — 73502 X-RAY EXAM HIP UNI 2-3 VIEWS: CPT | Mod: LT

## 2024-07-29 PROCEDURE — 70450 CT HEAD/BRAIN W/O DYE: CPT

## 2024-07-29 PROCEDURE — 70450 CT HEAD/BRAIN W/O DYE: CPT | Performed by: STUDENT IN AN ORGANIZED HEALTH CARE EDUCATION/TRAINING PROGRAM

## 2024-07-29 PROCEDURE — 93005 ELECTROCARDIOGRAM TRACING: CPT

## 2024-07-29 RX ORDER — ASPIRIN 81 MG/1
81 TABLET ORAL DAILY
Status: DISCONTINUED | OUTPATIENT
Start: 2024-07-30 | End: 2024-08-01 | Stop reason: HOSPADM

## 2024-07-29 RX ORDER — ONDANSETRON HYDROCHLORIDE 2 MG/ML
4 INJECTION, SOLUTION INTRAVENOUS ONCE
Status: COMPLETED | OUTPATIENT
Start: 2024-07-29 | End: 2024-07-29

## 2024-07-29 RX ORDER — LISINOPRIL 20 MG/1
40 TABLET ORAL DAILY
Status: DISCONTINUED | OUTPATIENT
Start: 2024-07-30 | End: 2024-08-01 | Stop reason: HOSPADM

## 2024-07-29 RX ORDER — TALC
3 POWDER (GRAM) TOPICAL NIGHTLY PRN
Status: DISCONTINUED | OUTPATIENT
Start: 2024-07-29 | End: 2024-08-01 | Stop reason: HOSPADM

## 2024-07-29 RX ORDER — ONDANSETRON HYDROCHLORIDE 2 MG/ML
4 INJECTION, SOLUTION INTRAVENOUS EVERY 8 HOURS PRN
Status: DISCONTINUED | OUTPATIENT
Start: 2024-07-29 | End: 2024-08-01 | Stop reason: HOSPADM

## 2024-07-29 RX ORDER — FLUOXETINE HYDROCHLORIDE 20 MG/1
40 CAPSULE ORAL DAILY
Status: DISCONTINUED | OUTPATIENT
Start: 2024-07-30 | End: 2024-08-01 | Stop reason: HOSPADM

## 2024-07-29 RX ORDER — FLUTICASONE FUROATE AND VILANTEROL 200; 25 UG/1; UG/1
1 POWDER RESPIRATORY (INHALATION) DAILY
Status: DISCONTINUED | OUTPATIENT
Start: 2024-07-30 | End: 2024-08-01 | Stop reason: HOSPADM

## 2024-07-29 RX ORDER — HYDROCHLOROTHIAZIDE 25 MG/1
25 TABLET ORAL DAILY
Status: DISCONTINUED | OUTPATIENT
Start: 2024-07-30 | End: 2024-08-01 | Stop reason: HOSPADM

## 2024-07-29 RX ORDER — CETIRIZINE HYDROCHLORIDE 10 MG/1
10 TABLET ORAL DAILY
Status: DISCONTINUED | OUTPATIENT
Start: 2024-07-30 | End: 2024-08-01 | Stop reason: HOSPADM

## 2024-07-29 RX ORDER — GUAIFENESIN 600 MG/1
600 TABLET, EXTENDED RELEASE ORAL EVERY 12 HOURS PRN
Status: DISCONTINUED | OUTPATIENT
Start: 2024-07-29 | End: 2024-08-01 | Stop reason: HOSPADM

## 2024-07-29 RX ORDER — BISACODYL 10 MG/1
10 SUPPOSITORY RECTAL DAILY PRN
Status: DISCONTINUED | OUTPATIENT
Start: 2024-07-29 | End: 2024-08-01 | Stop reason: HOSPADM

## 2024-07-29 RX ORDER — ROSUVASTATIN CALCIUM 20 MG/1
20 TABLET, COATED ORAL NIGHTLY
Status: DISCONTINUED | OUTPATIENT
Start: 2024-07-30 | End: 2024-08-01 | Stop reason: HOSPADM

## 2024-07-29 RX ORDER — METOPROLOL TARTRATE 25 MG/1
25 TABLET, FILM COATED ORAL 2 TIMES DAILY
Status: DISCONTINUED | OUTPATIENT
Start: 2024-07-29 | End: 2024-08-01 | Stop reason: HOSPADM

## 2024-07-29 RX ORDER — POTASSIUM CHLORIDE 1.5 G/1.58G
40 POWDER, FOR SOLUTION ORAL ONCE
Status: COMPLETED | OUTPATIENT
Start: 2024-07-29 | End: 2024-07-29

## 2024-07-29 RX ORDER — MORPHINE SULFATE 4 MG/ML
4 INJECTION INTRAVENOUS ONCE
Status: COMPLETED | OUTPATIENT
Start: 2024-07-29 | End: 2024-07-29

## 2024-07-29 RX ORDER — CLONAZEPAM 1 MG/1
1 TABLET ORAL 2 TIMES DAILY PRN
Status: DISCONTINUED | OUTPATIENT
Start: 2024-07-29 | End: 2024-08-01 | Stop reason: HOSPADM

## 2024-07-29 RX ORDER — BUSPIRONE HYDROCHLORIDE 5 MG/1
10 TABLET ORAL EVERY 12 HOURS
Status: DISCONTINUED | OUTPATIENT
Start: 2024-07-29 | End: 2024-08-01 | Stop reason: HOSPADM

## 2024-07-29 RX ORDER — CLOPIDOGREL BISULFATE 75 MG/1
75 TABLET ORAL DAILY
Status: DISCONTINUED | OUTPATIENT
Start: 2024-07-30 | End: 2024-08-01 | Stop reason: HOSPADM

## 2024-07-29 RX ORDER — BISACODYL 5 MG
10 TABLET, DELAYED RELEASE (ENTERIC COATED) ORAL DAILY PRN
Status: DISCONTINUED | OUTPATIENT
Start: 2024-07-29 | End: 2024-08-01 | Stop reason: HOSPADM

## 2024-07-29 RX ORDER — ONDANSETRON 4 MG/1
4 TABLET, FILM COATED ORAL EVERY 8 HOURS PRN
Status: DISCONTINUED | OUTPATIENT
Start: 2024-07-29 | End: 2024-08-01 | Stop reason: HOSPADM

## 2024-07-29 RX ORDER — TRAZODONE HYDROCHLORIDE 50 MG/1
50 TABLET ORAL NIGHTLY PRN
Status: DISCONTINUED | OUTPATIENT
Start: 2024-07-29 | End: 2024-08-01 | Stop reason: HOSPADM

## 2024-07-29 RX ORDER — ENOXAPARIN SODIUM 100 MG/ML
40 INJECTION SUBCUTANEOUS EVERY 24 HOURS
Status: DISCONTINUED | OUTPATIENT
Start: 2024-07-30 | End: 2024-08-01 | Stop reason: HOSPADM

## 2024-07-29 RX ADMIN — MORPHINE SULFATE 4 MG: 4 INJECTION, SOLUTION INTRAMUSCULAR; INTRAVENOUS at 18:15

## 2024-07-29 RX ADMIN — IOHEXOL 75 ML: 350 INJECTION, SOLUTION INTRAVENOUS at 19:48

## 2024-07-29 RX ADMIN — ONDANSETRON 4 MG: 2 INJECTION INTRAMUSCULAR; INTRAVENOUS at 18:14

## 2024-07-29 RX ADMIN — POTASSIUM CHLORIDE 40 MEQ: 1.5 POWDER, FOR SOLUTION ORAL at 18:57

## 2024-07-29 ASSESSMENT — LIFESTYLE VARIABLES
TOTAL SCORE: 0
HAVE PEOPLE ANNOYED YOU BY CRITICIZING YOUR DRINKING: NO
EVER HAD A DRINK FIRST THING IN THE MORNING TO STEADY YOUR NERVES TO GET RID OF A HANGOVER: NO
EVER FELT BAD OR GUILTY ABOUT YOUR DRINKING: NO
HAVE YOU EVER FELT YOU SHOULD CUT DOWN ON YOUR DRINKING: NO

## 2024-07-29 ASSESSMENT — PAIN DESCRIPTION - DESCRIPTORS: DESCRIPTORS: SORE

## 2024-07-29 ASSESSMENT — PAIN DESCRIPTION - ORIENTATION: ORIENTATION: LEFT

## 2024-07-29 ASSESSMENT — COLUMBIA-SUICIDE SEVERITY RATING SCALE - C-SSRS
6. HAVE YOU EVER DONE ANYTHING, STARTED TO DO ANYTHING, OR PREPARED TO DO ANYTHING TO END YOUR LIFE?: NO
1. IN THE PAST MONTH, HAVE YOU WISHED YOU WERE DEAD OR WISHED YOU COULD GO TO SLEEP AND NOT WAKE UP?: NO
2. HAVE YOU ACTUALLY HAD ANY THOUGHTS OF KILLING YOURSELF?: NO

## 2024-07-29 ASSESSMENT — PAIN DESCRIPTION - LOCATION: LOCATION: HIP

## 2024-07-29 ASSESSMENT — PAIN - FUNCTIONAL ASSESSMENT: PAIN_FUNCTIONAL_ASSESSMENT: 0-10

## 2024-07-29 ASSESSMENT — PAIN SCALES - GENERAL
PAINLEVEL_OUTOF10: 4
PAINLEVEL_OUTOF10: 8

## 2024-07-29 NOTE — ED PROVIDER NOTES
HPI   Chief Complaint   Patient presents with    Fall     Pt reports left flank pain after fall 2 days ago. Unsure about  LOC. Reports decreased urination. No head injury. Takes Plavix.       HPI  Patient is a 61-year-old female presents emergency department for left-sided pain status post fall.  Past medical history of CAD status post stent on aspirin and Plavix, COPD on oxygen 8 hours a day.  2 nights ago, patient woke up in the middle of the night to the bathroom and exiting she noticed that she was on the ground laying on top of her crock that holds her urine for lana.  She was laying on her left side.  Does not know why she fell.  Was able to pick her sock off the ground.  She does live at home alone and has been able to do her ADLs but states that she is having worsening left hip and left lower back pain.  Denies chest pain, shortness of breath, send anesthesia, loss of control of bowel or bladder movements, difficulty urinating, numbness or weakness in her legs or hematuria.      Patient History   Past Medical History:   Diagnosis Date    Anxiety 09/19/2012    Mixed anxiety depressive disorder 02/23/2022    Old myocardial infarction 11/19/2020    H/O non-ST elevation myocardial infarction (NSTEMI)    Opioid dependence, uncomplicated (Multi) 11/05/2020    Opioid dependence    Personal history of other diseases of the digestive system     History of ischemic colitis    Personal history of other infectious and parasitic diseases     H/O Clostridium difficile infection    Posterior reversible encephalopathy syndrome 12/11/2021    Primary hypertension 12/11/2021     Past Surgical History:   Procedure Laterality Date    OTHER SURGICAL HISTORY  11/11/2019    Retinal detachment repair    OTHER SURGICAL HISTORY  11/11/2019    Carpal tunnel surgery    OTHER SURGICAL HISTORY  11/05/2020    Tubal ligation    OTHER SURGICAL HISTORY  02/23/2022    Eye surgery    OTHER SURGICAL HISTORY  11/05/2020    Coronary artery  stent placement     No family history on file.  Social History     Tobacco Use    Smoking status: Every Day     Current packs/day: 0.50     Types: Cigarettes    Smokeless tobacco: Never   Vaping Use    Vaping status: Never Used   Substance Use Topics    Alcohol use: Never    Drug use: Never       Physical Exam   ED Triage Vitals [07/29/24 1729]   Temperature Heart Rate Respirations BP   36.8 °C (98.2 °F) 60 19 124/68      Pulse Ox Temp src Heart Rate Source Patient Position   (!) 89 % -- -- --      BP Location FiO2 (%)     -- --       Physical Exam  General: Well-developed, well-nourished patient lying in bed who appears non-toxic.  Head: Atraumatic, normocephalic.  Eyes: PERRL. Pupils 3 mm bilaterally.  ENT: No blood or trauma in the oropharynx. No nasal septal hematoma. Bilateral external ears atraumatic.   Neck: No midline cervical spine tenderness.  Heart: Regular rate and rhythm.  Lungs: Clear to auscultation bilaterally.    Abdomen: Soft, non-tender, non-distended, no guarding or peritoneal signs. Bilateral flanks atraumatic.  : Good gluteal squeeze.   Musculoskeletal: No chest wall tenderness. Pelvis stable to AP and lateral compression.  Left hip ecchymosis.  No midline TLS spinal tenderness.  Neurologic: GCS 15      ED Course & MDM   ED Course as of 07/30/24 0122 Mon Jul 29, 2024   1812 ECG 12 lead  Sinus rhythm with normal axis and low voltage in precordial leads [ARMANDO]   1848 POTASSIUM(!!): 2.9  Potassium 40 meq PO ordered [ARMANDO]      ED Course User Index  [ARMANDO] Marni Leo MD         Diagnoses as of 07/30/24 0122   Syncope and collapse   Fall, initial encounter   Head injury, initial encounter                       [place] Coma Scale Score: 15                        Medical Decision Making  Patient is a 61-year-old female presents emergency department for left hip pain status post fall.  Chronic conditions impacting care include  CAD status post stent on aspirin and Plavix, COPD on oxygen 8 hours  a day.  This patient does not recall what caused her to fall, concern for syncope.  Other differential diagnosis include electrolyte abnormalities, RAJINDER, arrhythmia, cranial injury, cervical spine injury, hip fracture, intra-abdominal injury, kidney injury.    CT and x-ray imaging with no acute traumatic injuries.  Concern for syncope as etiology of the patient's fall.  Patient would benefit from mission the hospital for further evaluation for syncope.  Discussed results of labs and imaging with the patient (and/or their surrogate) who is agreeable with admission to the hospital.       ED Course as of 07/30/24 0122 Mon Jul 29, 2024 1812 ECG 12 lead  Sinus rhythm with normal axis and low voltage in precordial leads [ARMANDO]   1848 POTASSIUM(!!): 2.9  Potassium 40 meq PO ordered [ARMANDO]      ED Course User Index  [ARMANDO] Marni Leo MD         Diagnoses as of 07/30/24 0122   Syncope and collapse   Fall, initial encounter   Head injury, initial encounter         Procedure  Procedures     Marni Leo MD  07/30/24 0122

## 2024-07-30 ENCOUNTER — APPOINTMENT (OUTPATIENT)
Dept: CARDIOLOGY | Facility: HOSPITAL | Age: 69
End: 2024-07-30
Payer: MEDICARE

## 2024-07-30 ENCOUNTER — HOSPITAL ENCOUNTER (INPATIENT)
Dept: NEUROLOGY | Facility: HOSPITAL | Age: 69
Discharge: HOME | End: 2024-07-30
Payer: MEDICARE

## 2024-07-30 PROBLEM — M25.552 LEFT HIP PAIN: Status: ACTIVE | Noted: 2024-07-30

## 2024-07-30 LAB
ANION GAP SERPL CALC-SCNC: 9 MMOL/L (ref 10–20)
AORTIC VALVE MEAN GRADIENT: 4.8 MMHG
AORTIC VALVE PEAK VELOCITY: 1.49 M/S
ATRIAL RATE: 60 BPM
AV PEAK GRADIENT: 8.9 MMHG
AVA (PEAK VEL): 2.33 CM2
AVA (VTI): 2.15 CM2
BUN SERPL-MCNC: 14 MG/DL (ref 6–23)
CALCIUM SERPL-MCNC: 8.5 MG/DL (ref 8.6–10.3)
CHLORIDE SERPL-SCNC: 103 MMOL/L (ref 98–107)
CO2 SERPL-SCNC: 33 MMOL/L (ref 21–32)
CREAT SERPL-MCNC: 0.82 MG/DL (ref 0.5–1.05)
EGFRCR SERPLBLD CKD-EPI 2021: 78 ML/MIN/1.73M*2
EJECTION FRACTION APICAL 4 CHAMBER: 62.1
EJECTION FRACTION: 58 %
ERYTHROCYTE [DISTWIDTH] IN BLOOD BY AUTOMATED COUNT: 12.7 % (ref 11.5–14.5)
GLUCOSE SERPL-MCNC: 116 MG/DL (ref 74–99)
HCT VFR BLD AUTO: 39 % (ref 36–46)
HGB BLD-MCNC: 12.5 G/DL (ref 12–16)
LEFT ATRIUM VOLUME AREA LENGTH INDEX BSA: 15.7 ML/M2
LEFT VENTRICLE INTERNAL DIMENSION DIASTOLE: 4.39 CM (ref 3.5–6)
LEFT VENTRICULAR OUTFLOW TRACT DIAMETER: 1.96 CM
LV EJECTION FRACTION BIPLANE: 58 %
MCH RBC QN AUTO: 30 PG (ref 26–34)
MCHC RBC AUTO-ENTMCNC: 32.1 G/DL (ref 32–36)
MCV RBC AUTO: 94 FL (ref 80–100)
MITRAL VALVE E/A RATIO: 1.11
NRBC BLD-RTO: 0 /100 WBCS (ref 0–0)
P AXIS: 45 DEGREES
PLATELET # BLD AUTO: 136 X10*3/UL (ref 150–450)
POTASSIUM SERPL-SCNC: 3.4 MMOL/L (ref 3.5–5.3)
PR INTERVAL: 185 MS
Q ONSET: 252 MS
QRS COUNT: 10 BEATS
QRS DURATION: 105 MS
QT INTERVAL: 454 MS
QTC CALCULATION(BAZETT): 454 MS
QTC FREDERICIA: 454 MS
R AXIS: 45 DEGREES
RBC # BLD AUTO: 4.17 X10*6/UL (ref 4–5.2)
RIGHT VENTRICLE FREE WALL PEAK S': 11.58 CM/S
RIGHT VENTRICLE PEAK SYSTOLIC PRESSURE: 25.1 MMHG
SODIUM SERPL-SCNC: 142 MMOL/L (ref 136–145)
T AXIS: 50 DEGREES
T OFFSET: 479 MS
TRICUSPID ANNULAR PLANE SYSTOLIC EXCURSION: 2.5 CM
VENTRICULAR RATE: 60 BPM
WBC # BLD AUTO: 7.5 X10*3/UL (ref 4.4–11.3)

## 2024-07-30 PROCEDURE — 1200000002 HC GENERAL ROOM WITH TELEMETRY DAILY

## 2024-07-30 PROCEDURE — 93306 TTE W/DOPPLER COMPLETE: CPT

## 2024-07-30 PROCEDURE — 36415 COLL VENOUS BLD VENIPUNCTURE: CPT | Performed by: STUDENT IN AN ORGANIZED HEALTH CARE EDUCATION/TRAINING PROGRAM

## 2024-07-30 PROCEDURE — 82374 ASSAY BLOOD CARBON DIOXIDE: CPT | Performed by: STUDENT IN AN ORGANIZED HEALTH CARE EDUCATION/TRAINING PROGRAM

## 2024-07-30 PROCEDURE — 2500000004 HC RX 250 GENERAL PHARMACY W/ HCPCS (ALT 636 FOR OP/ED): Performed by: STUDENT IN AN ORGANIZED HEALTH CARE EDUCATION/TRAINING PROGRAM

## 2024-07-30 PROCEDURE — 97161 PT EVAL LOW COMPLEX 20 MIN: CPT | Mod: GP | Performed by: PHYSICAL THERAPIST

## 2024-07-30 PROCEDURE — 97165 OT EVAL LOW COMPLEX 30 MIN: CPT | Mod: GO | Performed by: OCCUPATIONAL THERAPIST

## 2024-07-30 PROCEDURE — 95819 EEG AWAKE AND ASLEEP: CPT

## 2024-07-30 PROCEDURE — 2500000001 HC RX 250 WO HCPCS SELF ADMINISTERED DRUGS (ALT 637 FOR MEDICARE OP): Performed by: STUDENT IN AN ORGANIZED HEALTH CARE EDUCATION/TRAINING PROGRAM

## 2024-07-30 PROCEDURE — 2500000002 HC RX 250 W HCPCS SELF ADMINISTERED DRUGS (ALT 637 FOR MEDICARE OP, ALT 636 FOR OP/ED): Performed by: STUDENT IN AN ORGANIZED HEALTH CARE EDUCATION/TRAINING PROGRAM

## 2024-07-30 PROCEDURE — G0378 HOSPITAL OBSERVATION PER HR: HCPCS

## 2024-07-30 PROCEDURE — 99233 SBSQ HOSP IP/OBS HIGH 50: CPT | Performed by: INTERNAL MEDICINE

## 2024-07-30 PROCEDURE — 95819 EEG AWAKE AND ASLEEP: CPT | Performed by: PSYCHIATRY & NEUROLOGY

## 2024-07-30 PROCEDURE — 93306 TTE W/DOPPLER COMPLETE: CPT | Performed by: INTERNAL MEDICINE

## 2024-07-30 PROCEDURE — 85027 COMPLETE CBC AUTOMATED: CPT | Performed by: STUDENT IN AN ORGANIZED HEALTH CARE EDUCATION/TRAINING PROGRAM

## 2024-07-30 PROCEDURE — G0427 INPT/ED TELECONSULT70: HCPCS | Performed by: PSYCHIATRY & NEUROLOGY

## 2024-07-30 RX ORDER — FLUTICASONE PROPIONATE 50 MCG
1 SPRAY, SUSPENSION (ML) NASAL DAILY
COMMUNITY

## 2024-07-30 RX ORDER — SODIUM CHLORIDE 9 MG/ML
75 INJECTION, SOLUTION INTRAVENOUS CONTINUOUS
Status: ACTIVE | OUTPATIENT
Start: 2024-07-30 | End: 2024-07-30

## 2024-07-30 RX ADMIN — SODIUM CHLORIDE 75 ML/HR: 9 INJECTION, SOLUTION INTRAVENOUS at 01:14

## 2024-07-30 RX ADMIN — TRAZODONE HYDROCHLORIDE 50 MG: 50 TABLET ORAL at 20:29

## 2024-07-30 RX ADMIN — ASPIRIN 81 MG: 81 TABLET, COATED ORAL at 09:06

## 2024-07-30 RX ADMIN — ENOXAPARIN SODIUM 40 MG: 40 INJECTION SUBCUTANEOUS at 09:05

## 2024-07-30 RX ADMIN — ROSUVASTATIN 20 MG: 20 TABLET, FILM COATED ORAL at 01:16

## 2024-07-30 RX ADMIN — ROSUVASTATIN 20 MG: 20 TABLET, FILM COATED ORAL at 20:29

## 2024-07-30 RX ADMIN — METOPROLOL TARTRATE 25 MG: 25 TABLET, FILM COATED ORAL at 20:29

## 2024-07-30 RX ADMIN — HYDROCHLOROTHIAZIDE 25 MG: 25 TABLET ORAL at 09:06

## 2024-07-30 RX ADMIN — FLUTICASONE FUROATE AND VILANTEROL TRIFENATATE 1 PUFF: 200; 25 POWDER RESPIRATORY (INHALATION) at 09:06

## 2024-07-30 RX ADMIN — CLOPIDOGREL 75 MG: 75 TABLET ORAL at 09:06

## 2024-07-30 RX ADMIN — BUSPIRONE HYDROCHLORIDE 10 MG: 5 TABLET ORAL at 09:06

## 2024-07-30 RX ADMIN — FLUOXETINE HYDROCHLORIDE 40 MG: 20 CAPSULE ORAL at 09:06

## 2024-07-30 RX ADMIN — CETIRIZINE HYDROCHLORIDE 10 MG: 10 TABLET, FILM COATED ORAL at 09:06

## 2024-07-30 RX ADMIN — LISINOPRIL 40 MG: 20 TABLET ORAL at 09:05

## 2024-07-30 RX ADMIN — METOPROLOL TARTRATE 25 MG: 25 TABLET, FILM COATED ORAL at 09:06

## 2024-07-30 RX ADMIN — TIOTROPIUM BROMIDE INHALATION SPRAY 2 PUFF: 3.12 SPRAY, METERED RESPIRATORY (INHALATION) at 09:07

## 2024-07-30 RX ADMIN — BUSPIRONE HYDROCHLORIDE 10 MG: 5 TABLET ORAL at 20:29

## 2024-07-30 RX ADMIN — HUMAN ALBUMIN MICROSPHERES AND PERFLUTREN 0.5 ML: 10; .22 INJECTION, SOLUTION INTRAVENOUS at 13:22

## 2024-07-30 RX ADMIN — METOPROLOL TARTRATE 25 MG: 25 TABLET, FILM COATED ORAL at 01:14

## 2024-07-30 RX ADMIN — TRAZODONE HYDROCHLORIDE 50 MG: 50 TABLET ORAL at 01:14

## 2024-07-30 SDOH — SOCIAL STABILITY: SOCIAL INSECURITY: HAVE YOU HAD THOUGHTS OF HARMING ANYONE ELSE?: NO

## 2024-07-30 SDOH — SOCIAL STABILITY: SOCIAL INSECURITY: ABUSE: ADULT

## 2024-07-30 SDOH — SOCIAL STABILITY: SOCIAL INSECURITY: HAS ANYONE EVER THREATENED TO HURT YOUR FAMILY OR YOUR PETS?: NO

## 2024-07-30 SDOH — SOCIAL STABILITY: SOCIAL INSECURITY: DO YOU FEEL UNSAFE GOING BACK TO THE PLACE WHERE YOU ARE LIVING?: NO

## 2024-07-30 SDOH — SOCIAL STABILITY: SOCIAL INSECURITY: HAVE YOU HAD ANY THOUGHTS OF HARMING ANYONE ELSE?: NO

## 2024-07-30 SDOH — SOCIAL STABILITY: SOCIAL INSECURITY: DO YOU FEEL ANYONE HAS EXPLOITED OR TAKEN ADVANTAGE OF YOU FINANCIALLY OR OF YOUR PERSONAL PROPERTY?: NO

## 2024-07-30 SDOH — SOCIAL STABILITY: SOCIAL INSECURITY: WERE YOU ABLE TO COMPLETE ALL THE BEHAVIORAL HEALTH SCREENINGS?: YES

## 2024-07-30 SDOH — SOCIAL STABILITY: SOCIAL INSECURITY: DOES ANYONE TRY TO KEEP YOU FROM HAVING/CONTACTING OTHER FRIENDS OR DOING THINGS OUTSIDE YOUR HOME?: NO

## 2024-07-30 SDOH — SOCIAL STABILITY: SOCIAL INSECURITY: ARE THERE ANY APPARENT SIGNS OF INJURIES/BEHAVIORS THAT COULD BE RELATED TO ABUSE/NEGLECT?: NO

## 2024-07-30 SDOH — SOCIAL STABILITY: SOCIAL INSECURITY: ARE YOU OR HAVE YOU BEEN THREATENED OR ABUSED PHYSICALLY, EMOTIONALLY, OR SEXUALLY BY ANYONE?: NO

## 2024-07-30 ASSESSMENT — COGNITIVE AND FUNCTIONAL STATUS - GENERAL
CLIMB 3 TO 5 STEPS WITH RAILING: A LITTLE
CLIMB 3 TO 5 STEPS WITH RAILING: A LITTLE
PATIENT BASELINE BEDBOUND: NO
WALKING IN HOSPITAL ROOM: A LITTLE
DRESSING REGULAR LOWER BODY CLOTHING: A LITTLE
DAILY ACTIVITIY SCORE: 24
STANDING UP FROM CHAIR USING ARMS: A LITTLE
TURNING FROM BACK TO SIDE WHILE IN FLAT BAD: A LITTLE
CLIMB 3 TO 5 STEPS WITH RAILING: A LOT
WALKING IN HOSPITAL ROOM: A LITTLE
MOVING TO AND FROM BED TO CHAIR: A LITTLE
MOVING TO AND FROM BED TO CHAIR: A LITTLE
CLIMB 3 TO 5 STEPS WITH RAILING: A LOT
TURNING FROM BACK TO SIDE WHILE IN FLAT BAD: A LITTLE
MOBILITY SCORE: 23
TOILETING: A LITTLE
DAILY ACTIVITIY SCORE: 24
MOBILITY SCORE: 18
DAILY ACTIVITIY SCORE: 21
HELP NEEDED FOR BATHING: A LITTLE
DAILY ACTIVITIY SCORE: 21
DRESSING REGULAR LOWER BODY CLOTHING: A LITTLE
MOBILITY SCORE: 18
TOILETING: A LITTLE
MOBILITY SCORE: 23
STANDING UP FROM CHAIR USING ARMS: A LITTLE
HELP NEEDED FOR BATHING: A LITTLE

## 2024-07-30 ASSESSMENT — ENCOUNTER SYMPTOMS
NERVOUS/ANXIOUS: 0
DYSURIA: 0
NAUSEA: 0
FLANK PAIN: 0
SHORTNESS OF BREATH: 0
DIZZINESS: 0
HEMATURIA: 0
FATIGUE: 0
BACK PAIN: 0
DECREASED CONCENTRATION: 0
PALPITATIONS: 0
CHEST TIGHTNESS: 0
ACTIVITY CHANGE: 0
STRIDOR: 0
LIGHT-HEADEDNESS: 0
DIFFICULTY URINATING: 0
CHILLS: 0
COUGH: 0
APPETITE CHANGE: 0
FEVER: 0
CONSTIPATION: 0
DIAPHORESIS: 0
WEAKNESS: 0
CONFUSION: 0
NUMBNESS: 0
VOMITING: 0
DIARRHEA: 0
FREQUENCY: 0
WOUND: 0
SORE THROAT: 0
APNEA: 0
ABDOMINAL PAIN: 0
WHEEZING: 0
COLOR CHANGE: 0
ABDOMINAL DISTENTION: 0
DIZZINESS: 1
SINUS PAIN: 0
RHINORRHEA: 0
HEADACHES: 0
AGITATION: 0
ARTHRALGIAS: 1
MYALGIAS: 0
JOINT SWELLING: 0

## 2024-07-30 ASSESSMENT — PATIENT HEALTH QUESTIONNAIRE - PHQ9
2. FEELING DOWN, DEPRESSED OR HOPELESS: NOT AT ALL
2. FEELING DOWN, DEPRESSED OR HOPELESS: NOT AT ALL
SUM OF ALL RESPONSES TO PHQ9 QUESTIONS 1 & 2: 0
1. LITTLE INTEREST OR PLEASURE IN DOING THINGS: NOT AT ALL
SUM OF ALL RESPONSES TO PHQ9 QUESTIONS 1 & 2: 0
1. LITTLE INTEREST OR PLEASURE IN DOING THINGS: NOT AT ALL

## 2024-07-30 ASSESSMENT — PAIN - FUNCTIONAL ASSESSMENT: PAIN_FUNCTIONAL_ASSESSMENT: 0-10

## 2024-07-30 ASSESSMENT — PAIN SCALES - GENERAL
PAINLEVEL_OUTOF10: 0 - NO PAIN
PAINLEVEL_OUTOF10: 6
PAINLEVEL_OUTOF10: 3

## 2024-07-30 ASSESSMENT — ACTIVITIES OF DAILY LIVING (ADL)
HEARING - LEFT EAR: FUNCTIONAL
HEARING - RIGHT EAR: FUNCTIONAL
LACK_OF_TRANSPORTATION: NO
ADEQUATE_TO_COMPLETE_ADL: YES
TOILETING: INDEPENDENT
ADL_ASSISTANCE: INDEPENDENT
GROOMING: INDEPENDENT
DRESSING YOURSELF: INDEPENDENT
PATIENT'S MEMORY ADEQUATE TO SAFELY COMPLETE DAILY ACTIVITIES?: YES
WALKS IN HOME: INDEPENDENT
BATHING: INDEPENDENT
JUDGMENT_ADEQUATE_SAFELY_COMPLETE_DAILY_ACTIVITIES: YES
FEEDING YOURSELF: INDEPENDENT
ASSISTIVE_DEVICE: OTHER (COMMENT)

## 2024-07-30 ASSESSMENT — LIFESTYLE VARIABLES
HOW MANY STANDARD DRINKS CONTAINING ALCOHOL DO YOU HAVE ON A TYPICAL DAY: PATIENT DOES NOT DRINK
HOW MANY STANDARD DRINKS CONTAINING ALCOHOL DO YOU HAVE ON A TYPICAL DAY: PATIENT DOES NOT DRINK
SKIP TO QUESTIONS 9-10: 1
HOW OFTEN DO YOU HAVE 6 OR MORE DRINKS ON ONE OCCASION: NEVER
AUDIT-C TOTAL SCORE: 0
HOW OFTEN DO YOU HAVE A DRINK CONTAINING ALCOHOL: NEVER
AUDIT-C TOTAL SCORE: 0
HOW OFTEN DO YOU HAVE A DRINK CONTAINING ALCOHOL: NEVER

## 2024-07-30 ASSESSMENT — PAIN DESCRIPTION - DESCRIPTORS: DESCRIPTORS: RADIATING

## 2024-07-30 NOTE — PROGRESS NOTES
Physical Therapy    Physical Therapy Evaluation    Patient Name: Amy Montague  MRN: [id number]  Today's Date: 7/30/2024   Time Calculation  Start Time: 1023  Stop Time: 1036  Time Calculation (min): 13 min  2330/2330-A    Assessment/Plan   PT Assessment  PT Assessment Results: Decreased endurance, Impaired balance, Decreased mobility, Pain  Rehab Prognosis: Good  Barriers to Discharge: none  Evaluation/Treatment Tolerance: Patient tolerated treatment well  Medical Staff Made Aware: Yes  End of Session Communication: PCT/NA/CTA, Bedside nurse  Assessment Comment: Patient requires only CGA for mobility. Anticipate good improvement over course of admit.  End of Session Patient Position: Up in chair, Alarm on  IP OR SWING BED PT PLAN  Inpatient or Swing Bed: Inpatient  PT Plan  Treatment/Interventions: Bed mobility, Transfer training, Gait training, Stair training, Balance training, Strengthening, Endurance training, Therapeutic exercise, Therapeutic activity, Home exercise program  PT Plan: Ongoing PT  PT Frequency: 4 times per week  PT Discharge Recommendations: Low intensity level of continued care  Equipment Recommended upon Discharge:  (TBD)  PT - OK to Discharge: Yes (when medically cleared)    General Visit Information:  General  Reason for Referral: Dx: fall, ? syncope, L hip/back pain  Referred By: Gualberto  Past Medical History Relevant to Rehab: CAD, COPD on 2L/min, HTN, anxiety/depression, tobacco use  Prior to Session Communication: Bedside nurse  Patient Position Received: Bed, 3 rail up, Alarm on  General Comment: Seen in room 2330, tele, IV, O2 on 2-3L/min    Home Living:  Home Living  Type of Home:  (Lives alone in 1 level home with few steps to enter. Patient amb without AD, duaghter assists with driving/chores.)    Prior Level of Function:       Precautions:  Precautions  Precautions Comment: falls, O2    Vital Signs:     Objective     Pain:  Pain Assessment  0-10 (Numeric) Pain Score:  (reported pain in  L hip/L side lumbar; did not rate, did not appear to increase with amb)    Cognition:  Cognition  Overall Cognitive Status: Within Functional Limits    General Assessments:  General Observation  General Observation: slighty quick to sit up EOB needing cues for safety to maintain sitting and not stand immediately until symptoms of any dizziness were assessed - no dizziness reported with eval   Activity Tolerance  Endurance: Tolerates 10 - 20 min exercise with multiple rests     Strength  Strength Comments: URMILA LE quads grossly 4+/5        Postural Control  Postural Control:  (Sitting balance fair/fair+; standing balance fair with slight antalgic gait favoring L side)          Functional Assessments:     Bed Mobility  Bed Mobility:  (Supine to sit with SBa x1 and bed rail; cues for sequencing, safety)  Transfers  Transfer:  (Sit to stand with CGA x1; cues for sequencing, safety, balance, posture)  Ambulation/Gait Training  Ambulation/Gait Training Performed:  (Amb 25 feet in room with CGA x1; cues for sequencing, safety, balance, posture - occasionally using foot board of bed for support)          Extremity/Trunk Assessments:                Outcome Measures:     [facility] Basic Mobility  Turning from your back to your side while in a flat bed without using bedrails: None  Moving from lying on your back to sitting on the side of a flat bed without using bedrails: A little  Moving to and from bed to chair (including a wheelchair): A little  Standing up from a chair using your arms (e.g. wheelchair or bedside chair): A little  To walk in hospital room: A little  Climbing 3-5 steps with railing: A lot  Basic Mobility - Total Score: 18                                        Goals:  Encounter Problems       Encounter Problems (Active)       PT Problem       transfers       Start:  07/30/24    Expected End:  08/06/24       Patient will perform all transfers with SBA x1, good safety awareness           gait       Start:  07/30/24     Expected End:  08/06/24       Patient will amb 100+ feet with SBA x1, no LOB, FWW vs cane prn          strengthening        Start:  07/30/24    Expected End:  08/06/24       Patient will perform 20+ reps of AROM/RROM for URMILA LE's to improve safety and functional independence              Pain - Adult            Education Documentation  Precautions, taught by Sharon Chávez, PT at 7/30/2024 11:31 AM.  Learner: Patient  Readiness: Acceptance  Method: Explanation  Response: Needs Reinforcement    Mobility Training, taught by Sharon Chávez, PT at 7/30/2024 11:31 AM.  Learner: Patient  Readiness: Acceptance  Method: Explanation  Response: Needs Reinforcement    Education Comments  No comments found.

## 2024-07-30 NOTE — PROGRESS NOTES
"Amy Montague is a 69 y.o. female on day 0 of admission presenting with Syncope and collapse.      Subjective   Amy Montague is a 69 y.o. female with PMHx s/f CAD w stents on DAPT, COPD on 2 L chronically, PRES, HLD, FADIA, HTN, anxiety/depression presenting with fall and syncope.Pt says two days ago she might have tripped over her O2 tank while going to the bathroom in the middle of the night.She woke up on the floor after and indeterminate amount of time not remember what happened. Denies head trauma. Since then she has been having L hip and back pain. No other recent falls or lightheadedness or syncope. No nausea, vomiting, worsening shortness of breath, chest pain, abdominal pain, or leg swelling. She is having issues walking. No urinary or bowel problems, weakness in legs, or other symptoms. She says she has been taking all of her medications as prescribed. No stroke like symptoms or focal neurological deficit. States she still smokes tobacco \"occasionally.\"     ED Course (Summary):   Vitals on presentation: 98.2 F, 60 bpm, 19 rr, 124/68, 89% on ? --> 95% on 2.5 L NC  Labs: CMP glu 118, K 2.9  Mag 1.87  Lipase 32  Trop 3  CBC unremarkable  Imaging: XR hip - 1.  No acute osseous findings involving the left hip.   2.  Mild degenerative changes.   CXR - No acute process.   CT cervical - NO ACUTE FRACTURE OR TRAUMATIC SUBLUXATION.   CT head - No acute intracranial hemorrhage, mass effect, or CT apparent acute   infarct.   CT a/p w contrast - No evidence for renal or ureteral calculi.  No evidence of   hydronephrosis.   Diverticulosis without diverticulitis.   No acute process..   Interventions: morphine 4 mg X1, Zofran 4 mg X1, Klor con 30 meq X1    7/30: Patient was seen and examined.  She was awake alert and interactive.  Echocardiogram is still pending.  Orthostatic blood pressure done was positive.  Will continue IV fluids.  Will repeat orthostatic and consider LR bolus if persistent.  She denies any history " of seizures however in the setting of PRES I will order EEG.  Neurologist consulted.  Objective     Last Recorded Vitals  /77 (BP Location: Right arm, Patient Position: Lying)   Pulse 71   Temp 36.8 °C (98.2 °F) (Temporal)   Resp 18   Wt 59.8 kg (131 lb 13.4 oz)   SpO2 95%   Intake/Output last 3 Shifts:    Intake/Output Summary (Last 24 hours) at 7/30/2024 1027  Last data filed at 7/30/2024 1006  Gross per 24 hour   Intake 282.5 ml   Output 2 ml   Net 280.5 ml       Admission Weight  Weight: 59.8 kg (131 lb 13.4 oz) (07/29/24 1729)    Daily Weight  07/29/24 : 59.8 kg (131 lb 13.4 oz)    Image Results  ECG 12 lead  Sinus rhythm  Low voltage, precordial leads      Physical Exam    Vitals:    07/30/24 0924   BP: 132/77   Pulse: 71   Resp: 18   Temp: 36.8 °C (98.2 °F)   SpO2: 95%        Constitutional: Pleasant and cooperative. Laying in bed in no acute distress. Conversant.   Skin: Warm and dry; no obvious lesions, rashes, pallor, or jaundice. Good turgor.   Eyes: EOMI. Anicteric sclera.   ENT: Mucous membranes moist; no obvious injury or deformity appreciated.   Head and Neck: Normocephalic, atraumatic. ROM preserved. Trachea midline. No appreciable JVD.   Respiratory: Nonlabored on 2.5 L NC. Lungs clear to auscultation bilaterally without obvious adventitious sounds. Chest rise is equal.  Cardiovascular: RRR. No gross murmur, gallop, or rub. Extremities are warm and well-perfused with good capillary refill (< 3 seconds). No chest wall tenderness.   GI: Abdomen soft, nontender, nondistended. No obvious organomegaly appreciated. Bowel sounds are present and normoactive.  : No CVA tenderness.   MSK: No gross abnormalities appreciated. No limitations to AROM/PROM appreciated.   Extremities: No cyanosis, edema, or clubbing evident. Neurovascularly intact.   Neuro: A&Ox3. CN 2-12 grossly intact. Able to respond to questions appropriately and clearly. No acute focal neurologic deficits appreciated.  Psych:  Appropriate mood and behavior.     Relevant Results               Assessment/Plan                  Principal Problem:    Syncope and collapse  Active Problems:    Anxiety and depression    FADIA (generalized anxiety disorder)    CAD (coronary atherosclerotic disease)    Chronic respiratory failure with hypoxia, on home O2 therapy (Multi)    Hyperlipidemia    Essential hypertension    COPD, moderate (Multi)    Hypokalemia    Left hip pain    Fall/syncope ?Seizures  In the setting of history of PRES  CT of the head was negative for any acute process  Continue gentle hydration  TTE pending  EEG  Orthostatics ordered  Neurologist consulted     Ambulation issues, hip pain - PT/OT consulted  Imaging negative for fractures or acute findings.     CAD:  Home ASA and Plavix  Home Lopressor  Home statin     Psych:  Home Buspar, Prozac     COPD:  Home inhalers     HTN:  Home lisinopril and HCTZ     Diet: Cardiac  DVT Prophylaxis: Lovenox SQ  Code Status: DNR/DNI per discussion with pt on admission toda       Spent 40 minutes in the follow-up management of this patient today       Staice Greco MD

## 2024-07-30 NOTE — PROGRESS NOTES
Occupational Therapy    Evaluation    Patient Name: Amy Montague  MRN: [id number]  Today's Date: 7/30/2024  Time Calculation  Start Time: 1024  Stop Time: 1035  Time Calculation (min): 11 min  2330/2330-A    Assessment  IP OT Assessment  OT Assessment: CGA ADLs and amb. with 2 liters O2  End of Session Communication: PCT/NA/CTA  End of Session Patient Position: Up in chair, Alarm on       07/30/24 1024   Inpatient Plan   Treatment Interventions ADL retraining;Functional transfer training   OT Frequency 4 times per week   OT - OK to Discharge Yes  ((when medically approp.))   OT Discharge Recommendations Low intensity level of continued care   OT Recommended Transfer Status Assist of 1  (CGA)   Subjective     Current Problem:  1. Syncope and collapse        2. Fall, initial encounter        3. Head injury, initial encounter        4. LIM (dyspnea on exertion)  Transthoracic Echo (TTE) Complete    Transthoracic Echo (TTE) Complete          General:  General  Reason for Referral: fall, syncope, L hip pain, (-) for fx.  Referred By: Gualberto  Past Medical History Relevant to Rehab: Hx. of CAD, COPD, anx./dep.  Prior to Session Communication: Bedside nurse  Patient Position Received: Bed, 3 rail up, Alarm on  General Comment: 2 liters O2, c/o L hip pain, but agreeable to eval.    Precautions:  Medical Precautions: Oxygen therapy device and L/min (syncope)    Vital Signs:see nurses notes        Pain:  Pain Assessment  Pain Assessment:  (c/o L hip pain initially, but not during amb.)    Objective     Cognition:  Overall Cognitive Status: Within Functional Limits  Insight: Within function limits         Home Living:  Type of Home: Apartment  Lives With: Alone  Home Adaptive Equipment: None  Home Layout: One level     Prior Function:  Level of [place]: Independent with ADLs and functional transfers, Independent with homemaking with ambulation  ADL Assistance: Independent  Homemaking Assistance: Independent  Ambulatory  Assistance: Independent  Prior Function Comments: dtr. drives    IADL History:  Homemaking Responsibilities: Yes  Meal Prep Responsibility: Primary  Laundry Responsibility: Primary  Cleaning Responsibility: Primary    ADL:  LE Dressing Assistance: Stand by  Toileting Assistance with Device:  (CGA to/from BR)    Activity Tolerance:  Endurance: Endurance does not limit participation in activity    Bed Mobility/Transfers:   Bed Mobility  Bed Mobility: Yes (SBA supine-to-sit)  Transfers  Transfer: Yes (CGA)    Ambulation/Gait Training:  Functional Mobility  Functional Mobility Performed: Yes (CGA amb. in room)    Sitting Balance:       Standing Balance:  Dynamic Standing Balance  Dynamic Standing-Balance Support:  (SBA)    Vision: Vision - Basic Assessment  Current Vision: No visual deficits      Sensation:  Light Touch: No apparent deficits    Strength:  Strength Comments: UEs WNL    Perception:  Inattention/Neglect: Appears intact    Coordination:  Movements are Fluid and Coordinated: Yes     Hand Function:  Hand Function  Gross Grasp: Functional      Outcome Measures: [facility] Daily Activity  Putting on and taking off regular lower body clothing: A little  Bathing (including washing, rinsing, drying): A little  Putting on and taking off regular upper body clothing: None  Toileting, which includes using toilet, bedpan or urinal: A little  Taking care of personal grooming such as brushing teeth: None  Eating Meals: None  Daily Activity - Total Score: 21                    EDUCATION:     Education Documentation  Precautions, taught by Jovita Nuñez OT at 7/30/2024  1:08 PM.  Learner: Patient  Readiness: Acceptance  Method: Demonstration  Response: Demonstrated Understanding  Comment: use of call light    Education Comments  No comments found.        Goals:   Encounter Problems       Encounter Problems (Active)       ADLs       Demo. safe, supervised ADLs.  (Progressing)       Start:  07/30/24    Expected End:   08/06/24               MOBILITY       Demo. safe, supervised func. mobility without symptoms of fall risk.  (Progressing)       Start:  07/30/24    Expected End:  08/06/24

## 2024-07-30 NOTE — CARE PLAN
The patient's goals for the shift include      The clinical goals for the shift include to remain safe and free from injury/falls throughout this shift.

## 2024-07-30 NOTE — CARE PLAN
The patient's goals for the shift include      The clinical goals for the shift include to remain free from falls      Problem: Pain - Adult  Goal: Verbalizes/displays adequate comfort level or baseline comfort level  Outcome: Progressing     Problem: Safety - Adult  Goal: Free from fall injury  Outcome: Progressing     Problem: Discharge Planning  Goal: Discharge to home or other facility with appropriate resources  Outcome: Progressing     Problem: Chronic Conditions and Co-morbidities  Goal: Patient's chronic conditions and co-morbidity symptoms are monitored and maintained or improved  Outcome: Progressing     Problem: Fall/Injury  Goal: Not fall by end of shift  Outcome: Progressing  Goal: Be free from injury by end of the shift  Outcome: Progressing  Goal: Verbalize understanding of personal risk factors for fall in the hospital  Outcome: Progressing  Goal: Verbalize understanding of risk factor reduction measures to prevent injury from fall in the home  Outcome: Progressing  Goal: Use assistive devices by end of the shift  Outcome: Progressing  Goal: Pace activities to prevent fatigue by end of the shift  Outcome: Progressing     Problem: Pain  Goal: Takes deep breaths with improved pain control throughout the shift  Outcome: Progressing  Goal: Turns in bed with improved pain control throughout the shift  Outcome: Progressing  Goal: Walks with improved pain control throughout the shift  Outcome: Progressing  Goal: Performs ADL's with improved pain control throughout shift  Outcome: Progressing  Goal: Participates in PT with improved pain control throughout the shift  Outcome: Progressing  Goal: Free from opioid side effects throughout the shift  Outcome: Progressing  Goal: Free from acute confusion related to pain meds throughout the shift  Outcome: Progressing

## 2024-07-30 NOTE — PROGRESS NOTES
07/30/24 1515   Discharge Planning   Living Arrangements Alone   Support Systems Friends/neighbors;Family members   Assistance Needed IADL's   Type of Residence Private residence   Home or Post Acute Services In home services   Type of Home Care Services Home PT;Home OT;Home nursing visits   Expected Discharge Disposition HH Services   Does the patient need discharge transport arranged? No     Discharge planning assessment completed with patient. Patient lives alone in an apartment. She ambulates independently in her home. She has a motorized scooter but has been unable to get it out of her building to ride. Patient wears supplemental O2, 2L NC for 8 hours a day. O2 is supplied through [employer]. Patient confirmed her PCP is Tami Fisher at [facility] in [place]. She relies on friends and neighbors for rides to appointments and errands. Patient has children that live locally and also assist her when needed. Her daughter will transport her home when she is discharged. PT/OT evals were completed but notes were not available at the time of the assessment. We did discuss possible HHC, and patient would agree with this if recommended.

## 2024-07-30 NOTE — H&P
"[facility] - GENERAL MEDICINE HISTORY AND PHYSICAL    History Obtained From: Pt    History Of Present Illness:  Amy Montague is a 69 y.o. female with PMHx s/f CAD w stents on DAPT, COPD on 2 L chronically, PRES, HLD, FADIA, HTN, anxiety/depression presenting with fall and syncope.Pt says two days ago she might have tripped over her O2 tank while going to the bathroom in the middle of the night.She woke up on the floor after and indeterminate amount of time not remember what happened. Denies head trauma. Since then she has been having L hip and back pain. No other recent falls or lightheadedness or syncope. No nausea, vomiting, worsening shortness of breath, chest pain, abdominal pain, or leg swelling. She is having issues walking. No urinary or bowel problems, weakness in legs, or other symptoms. She says she has been taking all of her medications as prescribed. No stroke like symptoms or focal neurological deficit. States she still smokes tobacco \"occasionally.\"    ED Course (Summary):   Vitals on presentation: 98.2 F, 60 bpm, 19 rr, 124/68, 89% on ? --> 95% on 2.5 L NC  Labs: CMP glu 118, K 2.9  Mag 1.87  Lipase 32  Trop 3  CBC unremarkable  Imaging: XR hip - 1.  No acute osseous findings involving the left hip.   2.  Mild degenerative changes.   CXR - No acute process.   CT cervical - NO ACUTE FRACTURE OR TRAUMATIC SUBLUXATION.   CT head - No acute intracranial hemorrhage, mass effect, or CT apparent acute   infarct.   CT a/p w contrast - No evidence for renal or ureteral calculi.  No evidence of   hydronephrosis.   Diverticulosis without diverticulitis.   No acute process..   Interventions: morphine 4 mg X1, Zofran 4 mg X1, Klor con 30 meq X1    ED Course (From Provider):  ED Course as of 07/30/24 0008 Mon Jul 29, 2024 1848 POTASSIUM(!!): 2.9  Potassium 40 meq PO ordered [ARMANDO]      ED Course User Index  [ARMANDO] Marni Leo MD         Diagnoses as of 07/30/24 0008   Syncope and collapse "   Fall, initial encounter   Head injury, initial encounter     Relevant Results  Results for orders placed or performed during the hospital encounter of 07/29/24 (from the past 24 hour(s))   CBC and Auto Differential   Result Value Ref Range    WBC 7.1 4.4 - 11.3 x10*3/uL    nRBC 0.0 0.0 - 0.0 /100 WBCs    RBC 4.14 4.00 - 5.20 x10*6/uL    Hemoglobin 12.5 12.0 - 16.0 g/dL    Hematocrit 37.7 36.0 - 46.0 %    MCV 91 80 - 100 fL    MCH 30.2 26.0 - 34.0 pg    MCHC 33.2 32.0 - 36.0 g/dL    RDW 12.5 11.5 - 14.5 %    Platelets 146 (L) 150 - 450 x10*3/uL    Neutrophils % 69.2 40.0 - 80.0 %    Immature Granulocytes %, Automated 0.3 0.0 - 0.9 %    Lymphocytes % 22.3 13.0 - 44.0 %    Monocytes % 6.4 2.0 - 10.0 %    Eosinophils % 1.4 0.0 - 6.0 %    Basophils % 0.4 0.0 - 2.0 %    Neutrophils Absolute 4.90 1.20 - 7.70 x10*3/uL    Immature Granulocytes Absolute, Automated 0.02 0.00 - 0.70 x10*3/uL    Lymphocytes Absolute 1.58 1.20 - 4.80 x10*3/uL    Monocytes Absolute 0.45 0.10 - 1.00 x10*3/uL    Eosinophils Absolute 0.10 0.00 - 0.70 x10*3/uL    Basophils Absolute 0.03 0.00 - 0.10 x10*3/uL   Comprehensive metabolic panel   Result Value Ref Range    Glucose 118 (H) 74 - 99 mg/dL    Sodium 138 136 - 145 mmol/L    Potassium 2.9 (LL) 3.5 - 5.3 mmol/L    Chloride 99 98 - 107 mmol/L    Bicarbonate 32 21 - 32 mmol/L    Anion Gap 10 10 - 20 mmol/L    Urea Nitrogen 17 6 - 23 mg/dL    Creatinine 0.82 0.50 - 1.05 mg/dL    eGFR 78 >60 mL/min/1.73m*2    Calcium 8.9 8.6 - 10.3 mg/dL    Albumin 3.9 3.4 - 5.0 g/dL    Alkaline Phosphatase 61 33 - 136 U/L    Total Protein 6.5 6.4 - 8.2 g/dL    AST 15 9 - 39 U/L    Bilirubin, Total 0.3 0.0 - 1.2 mg/dL    ALT 16 7 - 45 U/L   Lipase   Result Value Ref Range    Lipase 32 9 - 82 U/L   Troponin I, High Sensitivity   Result Value Ref Range    Troponin I, High Sensitivity 3 0 - 13 ng/L   Magnesium   Result Value Ref Range    Magnesium 1.87 1.60 - 2.40 mg/dL      CT cervical spine wo IV contrast    Result  Date: 7/29/2024  Interpreted By:  Graeme Guallpa, STUDY: CT CERVICAL SPINE WO IV CONTRAST;  7/29/2024 7:46 pm   INDICATION: Signs/Symptoms:unwitnessed fall.   COMPARISON: None.   ACCESSION NUMBER(S): [id number]   ORDERING CLINICIAN: GRAHAM KASPER   TECHNIQUE: Thin section axial images were obtained from the skull base down through the thoracic inlet. Sagittal and coronal reconstruction images were generated. Soft tissue, lung, and bone windows were reviewed.   FINDINGS: ALIGNMENT: CERVICAL LORDOSIS MAINTAINED. ATLANTOAXIAL INTERVAL IS MAINTAINED.   VERTEBRAL BODIES AND POSTERIOR ELEMENTS: No cervical spine compression fracture.  No posterior element fracture.  No destructive bone lesion. No listhesis.   SPINAL CANAL: No gross disc herniation.   NECK SOFT TISSUES: Within normal limits.   LUNG APICES: Imaged portion of the lung apices are within normal limits.   SKULL BASE: Within normal limits.       NO ACUTE FRACTURE OR TRAUMATIC SUBLUXATION.   Signed by: Graeme Guallpa 7/29/2024 8:58 PM Dictation workstation:   [id number]    CT abdomen pelvis w IV contrast    Result Date: 7/29/2024  STUDY: CT Abdomen and Pelvis with IV Contrast; 7/29/2024 7:49 PM. INDICATION: Left flank pain.  Generalized abdominal pain.  Status post fall. COMPARISON: XR hip 7/29/2024.  CT chest 10/3/2022. ACCESSION NUMBER(S): [id number] ORDERING CLINICIAN: GRAHAM KASPER TECHNIQUE: CT of the abdomen and pelvis was performed.  Contiguous axial images were obtained at 3 mm slice thickness through the abdomen and pelvis. Coronal and sagittal reconstructions at 3 mm slice thickness were performed.  Omnipaque 350 75 mL was administered intravenously.  FINDINGS: LOWER CHEST: No cardiomegaly.  No pericardial effusion.  Lung bases are clear.  ABDOMEN:  LIVER: No hepatomegaly.  Smooth surface contour.  Normal attenuation.  BILE DUCTS: No intrahepatic or extrahepatic biliary ductal dilatation.  GALLBLADDER: The gallbladder is unremarkable.  STOMACH: No abnormalities identified.  PANCREAS: No masses or ductal dilatation.  SPLEEN: No splenomegaly or focal splenic lesion.  ADRENAL GLANDS: No thickening or nodules.  KIDNEYS AND URETERS: Kidneys are normal in size and location.  No renal or ureteral calculi.  PELVIS:  BLADDER: No abnormalities identified.  REPRODUCTIVE ORGANS: No abnormalities identified.  BOWEL: There is diverticulosis.  The appendix is identified and is normal.  VESSELS: No abnormalities identified.  Abdominal aorta is normal in caliber.  PERITONEUM/RETROPERITONEUM/LYMPH NODES: No free fluid.  No pneumoperitoneum. No lymphadenopathy.  ABDOMINAL WALL: No abnormalities identified. SOFT TISSUES: No abnormalities identified.  BONES: There is bilateral spondylolysis resulting in spondylolisthesis at L5-S1.  This does result in spinal stenosis and narrowing of the neural foramina.  No acute fracture or aggressive osseous lesion.    No evidence for renal or ureteral calculi.  No evidence of hydronephrosis. Diverticulosis without diverticulitis. No acute process.. Signed by Joe Gaines MD    CT head wo IV contrast    Result Date: 7/29/2024  Interpreted By:  Graeme Guallpa, STUDY: CT HEAD WO IV CONTRAST;  7/29/2024 7:46 pm   INDICATION: Signs/Symptoms:unwitnessed fall.   COMPARISON: CT brain 12/08/2021   ACCESSION NUMBER(S): [id number]   ORDERING CLINICIAN: GRAHAM KASPER   TECHNIQUE: Noncontrast axial CT scan of head was performed.   FINDINGS: Parenchyma: There is no intracranial hemorrhage. The grey-white differentiation is intact. There is no mass effect or midline shift.   CSF Spaces: The ventricles, sulci and basal cisterns are within normal limits for age.   Extra-Axial Fluid: There is no extraaxial fluid collection.   Calvarium: The calvarium is unremarkable.   Paranasal sinuses: Visualized paranasal sinuses are clear.   Mastoids: Clear.   Orbits: Normal.   Soft tissues: Unremarkable.       No acute intracranial hemorrhage, mass  effect, or CT apparent acute infarct.   Signed by: Graeme Guallpa 7/29/2024 8:13 PM Dictation workstation:   [id number]    XR hip left with pelvis when performed 2 or 3 views    Result Date: 7/29/2024  STUDY: Pelvis and Left Hip Radiographs; 7/29/2024 6:34 PM. INDICATION: Trauma, fall.  Left hip pain. COMPARISON: None. ACCESSION NUMBER(S): [id number] ORDERING CLINICIAN: GRAHAM HOOD CONSING TECHNIQUE:  AP view of the pelvis and 2 view(s) of the left hip. FINDINGS:  No acute fractures or dislocations are visualized within the left hip.  There is mild narrowing of the joint space.  No acute findings are seen within the iliac wings or pubic rami.    1.  No acute osseous findings involving the left hip. 2.  Mild degenerative changes. Signed by Anselmo Angel MD    XR chest 2 views    Result Date: 7/29/2024  STUDY: Chest Radiographs;  7/29/2024 6:34 PM. INDICATION: Syncopal episode with fall. COMPARISON: CXR 12/8/2021. ACCESSION NUMBER(S): [id number] ORDERING CLINICIAN: GRAHAM HOOD CONSING TECHNIQUE:  Frontal and lateral chest. FINDINGS: CARDIOMEDIASTINAL SILHOUETTE: Cardiomediastinal silhouette is normal in size and configuration.  LUNGS: Lungs are clear.  ABDOMEN: No remarkable upper abdominal findings.  BONES: No acute osseous changes.    No acute process. Signed by Geovanni Benson MD    Scheduled medications:  aspirin, 81 mg, oral, Daily  busPIRone, 10 mg, oral, q12h  cetirizine, 10 mg, oral, Daily  clopidogrel, 75 mg, oral, Daily  enoxaparin, 40 mg, subcutaneous, q24h  FLUoxetine, 40 mg, oral, Daily  fluticasone furoate-vilanteroL, 1 puff, inhalation, Daily  hydroCHLOROthiazide, 25 mg, oral, Daily  lisinopril, 40 mg, oral, Daily  metoprolol tartrate, 25 mg, oral, BID  rosuvastatin, 20 mg, oral, Nightly  tiotropium, 2 puff, inhalation, Daily      Continuous medications:  sodium chloride 0.9%, 75 mL/hr      PRN medications:  PRN medications: bisacodyl, bisacodyl, clonazePAM, guaiFENesin, melatonin, ondansetron  **OR** ondansetron, traZODone     Past Medical History  She has a past medical history of Anxiety (09/19/2012), Mixed anxiety depressive disorder (02/23/2022), Old myocardial infarction (11/19/2020), Opioid dependence, uncomplicated (Multi) (11/05/2020), Personal history of other diseases of the digestive system, Personal history of other infectious and parasitic diseases, Posterior reversible encephalopathy syndrome (12/11/2021), and Primary hypertension (12/11/2021).    Surgical History  She has a past surgical history that includes Other surgical history (11/11/2019); Other surgical history (11/11/2019); Other surgical history (11/05/2020); Other surgical history (02/23/2022); and Other surgical history (11/05/2020).     Social History  She reports that she has been smoking cigarettes. She has never used smokeless tobacco. She reports that she does not drink alcohol and does not use drugs.    Family History  No family history on file.    Allergies  Patient has no known allergies.    Code Status  DNR and No Intubation     Review of Systems   Constitutional:  Negative for activity change, appetite change, chills, diaphoresis, fatigue and fever.   HENT:  Negative for congestion, ear pain, rhinorrhea, sinus pain and sore throat.    Respiratory:  Negative for apnea, cough, chest tightness, shortness of breath, wheezing and stridor.    Cardiovascular:  Negative for chest pain, palpitations and leg swelling.   Gastrointestinal:  Negative for abdominal distention, abdominal pain, constipation, diarrhea, nausea and vomiting.   Genitourinary:  Negative for difficulty urinating, dysuria, flank pain, frequency, hematuria and urgency.   Musculoskeletal:  Positive for arthralgias and gait problem. Negative for back pain, joint swelling and myalgias.   Skin:  Negative for color change, pallor, rash and wound.   Neurological:  Positive for syncope. Negative for dizziness, weakness, light-headedness, numbness and headaches.    Psychiatric/Behavioral:  Negative for agitation, behavioral problems, confusion and decreased concentration. The patient is not nervous/anxious.    All other systems reviewed and are negative.      Last Recorded Vitals  /59   Pulse 54   Temp 36.8 °C (98.2 °F)   Resp 20   Wt 59.8 kg (131 lb 13.4 oz)   SpO2 95%      Physical Exam:  Vital signs and nursing notes reviewed.   Constitutional: Pleasant and cooperative. Laying in bed in no acute distress. Conversant.   Skin: Warm and dry; no obvious lesions, rashes, pallor, or jaundice. Good turgor.   Eyes: EOMI. Anicteric sclera.   ENT: Mucous membranes moist; no obvious injury or deformity appreciated.   Head and Neck: Normocephalic, atraumatic. ROM preserved. Trachea midline. No appreciable JVD.   Respiratory: Nonlabored on 2.5 L NC. Lungs clear to auscultation bilaterally without obvious adventitious sounds. Chest rise is equal.  Cardiovascular: RRR. No gross murmur, gallop, or rub. Extremities are warm and well-perfused with good capillary refill (< 3 seconds). No chest wall tenderness.   GI: Abdomen soft, nontender, nondistended. No obvious organomegaly appreciated. Bowel sounds are present and normoactive.  : No CVA tenderness.   MSK: No gross abnormalities appreciated. No limitations to AROM/PROM appreciated.   Extremities: No cyanosis, edema, or clubbing evident. Neurovascularly intact.   Neuro: A&Ox3. CN 2-12 grossly intact. Able to respond to questions appropriately and clearly. No acute focal neurologic deficits appreciated.  Psych: Appropriate mood and behavior.    Assessment/Plan   Principal Problem:    Syncope and collapse  Active Problems:    Anxiety and depression    FADIA (generalized anxiety disorder)    CAD (coronary atherosclerotic disease)    Chronic respiratory failure with hypoxia, on home O2 therapy (Multi)    Hyperlipidemia    Essential hypertension    COPD, moderate (Multi)    Hypokalemia    Left hip pain    Plan:  Admit to gen med  with tele    Fall/syncope:  Gentle hydration  TTE in AM  Orthostatics ordered    Ambulation issues, hip pain - PT/OT consulted  Imaging negative for fractures or acute findings.    CAD:  Home ASA and Plavix  Home Lopressor  Home statin    Psych:  Home Buspar, Prozac    COPD:  Home inhalers    HTN:  Home lisinopril and HCTZ    Diet: Cardiac  DVT Prophylaxis: Lovenox SQ  Code Status: DNR/DNI per discussion with pt on admission today.     DO Samanta Dixon dictation software was used to dictate this note and thus there may be minor errors in translation/transcription including garbled speech or misspellings. Please contact for clarification if needed.

## 2024-07-30 NOTE — CONSULTS
Inpatient consult to Neurology  Consult performed by: Mac Anne MD  Consult ordered by: Stacie Greco MD          History Of Present Illness  Amy Montague is a 69 y.o. female presenting with a fall.  The patient states that she was doing well and said that about 2 days ago she might of tripped over her O2 tank while going to the bathroom in the middle of the night.  She states that she woke up on the floor after an undetermined amount of time and does not remember what actually happened.  She denies head trauma but did hit her left side.  Since that time, the patient has been having left hip and back pain.  She denies any dizziness, headaches, nausea, vomiting, facial or extremity weakness or numbness or new walking problems.  The patient does have a scooter at baseline and does use home oxygen.  Currently the patient feels back to normal.  The patient was admitted through the ER and had a CT scan of the brain done that was negative for any acute process.  The patient had a CT scan of the cervical spine that showed no fracture or subluxation.  In 2021, the patient had an MRI of the brain that was consistent with PRES.    Past Medical History  Past Medical History:   Diagnosis Date    Anxiety 09/19/2012    Mixed anxiety depressive disorder 02/23/2022    Old myocardial infarction 11/19/2020    H/O non-ST elevation myocardial infarction (NSTEMI)    Opioid dependence, uncomplicated (Multi) 11/05/2020    Opioid dependence    Personal history of other diseases of the digestive system     History of ischemic colitis    Personal history of other infectious and parasitic diseases     H/O Clostridium difficile infection    Posterior reversible encephalopathy syndrome 12/11/2021    Primary hypertension 12/11/2021     Surgical History  Past Surgical History:   Procedure Laterality Date    OTHER SURGICAL HISTORY  11/11/2019    Retinal detachment repair    OTHER SURGICAL HISTORY  11/11/2019    Carpal tunnel  surgery    OTHER SURGICAL HISTORY  11/05/2020    Tubal ligation    OTHER SURGICAL HISTORY  02/23/2022    Eye surgery    OTHER SURGICAL HISTORY  11/05/2020    Coronary artery stent placement     Social History  Social History     Tobacco Use    Smoking status: Every Day     Current packs/day: 0.50     Types: Cigarettes    Smokeless tobacco: Never   Vaping Use    Vaping status: Never Used   Substance Use Topics    Alcohol use: Never    Drug use: Never   The patient social history shows that she lives at home.  She uses a scooter to get around and has home oxygen.    Allergies  Patient has no known allergies.  Medications Prior to Admission   Medication Sig Dispense Refill Last Dose    albuterol (ProAir HFA) 90 mcg/actuation inhaler Inhale 2 puffs every 4 hours if needed for wheezing or shortness of breath. 8.5 g 0 7/30/2024    albuterol (ProAir RespiClick) 90 mcg/actuation aerosol powdr breath activated inhaler Inhale 1 puff 2 times a day. PRN   7/30/2024    albuterol 0.63 mg/3 mL nebulizer solution Take 3 mL (0.63 mg) by nebulization every 4 hours if needed for wheezing.   7/30/2024    aspirin 81 mg EC tablet Take 1 tablet (81 mg) by mouth once daily.   7/29/2024    busPIRone (Buspar) 10 mg tablet Take 1 tablet (10 mg) by mouth every 12 hours.   7/29/2024    cetirizine (ZyrTEC) 10 mg tablet Take 1 tablet (10 mg) by mouth once daily.   7/29/2024    clonazePAM (KlonoPIN) 1 mg tablet Take 1 tablet (1 mg) by mouth 2 times a day as needed.   7/29/2024    clopidogrel (Plavix) 75 mg tablet Take 1 tablet (75 mg) by mouth once daily.   7/29/2024    FLUoxetine (PROzac) 40 mg capsule TAKE 1 CAPSULE BY MOUTH EVERY DAY FOR 30 DAYS   7/29/2024    fluticasone (Flonase) 50 mcg/actuation nasal spray Administer 1 spray into each nostril once daily. Shake gently. Before first use, prime pump. After use, clean tip and replace cap.   7/29/2024    fluticasone propion-salmeteroL (Advair Diskus) 250-50 mcg/dose diskus inhaler Inhale 1 puff  "2 times a day. Rinse mouth with water after use to reduce aftertaste and incidence of candidiasis. Do not swallow. 1 each 0 7/29/2024    hydroCHLOROthiazide (HYDRODiuril) 25 mg tablet Take 1 tablet (25 mg) by mouth once daily.   7/29/2024    lisinopril 40 mg tablet Take 1 tablet (40 mg) by mouth once daily.   7/29/2024    metoprolol tartrate (Lopressor) 25 mg tablet Take by mouth twice a day.   7/29/2024    omeprazole (PriLOSEC) 40 mg DR capsule TAKE 1 CAPSULE BY MOUTH EVERY DAY 30 MINUTES BEFORE MORNING MEAL   7/29/2024    oxygen (O2) gas therapy Oxygen   Refills: 0        Start : 5-Nov-2020   Active   7/29/2024    Spiriva with HandiHaler 18 mcg inhalation capsule 1 capsule (18 mcg) once daily at bedtime.   7/29/2024    acetaminophen (Tylenol Extra Strength) 500 mg tablet Take 1 tablet (500 mg) by mouth. PRN   Unknown    rosuvastatin (Crestor) 20 mg tablet Take 1 tablet (20 mg) by mouth once daily.   7/28/2024    traZODone (Desyrel) 50 mg tablet Take 1 tablet (50 mg) by mouth as needed at bedtime.   7/28/2024       Review of Systems   Neurological:  Positive for dizziness.   All other systems reviewed and are negative.    Family history  The patient's family history was reviewed and is noncontributory.    Neurological Exam  Physical Exam  Last Recorded Vitals  Blood pressure 117/54, pulse 64, temperature 36.7 °C (98.1 °F), temperature source Temporal, resp. rate 12, height 1.549 m (5' 1\"), weight 59.8 kg (131 lb 13.4 oz), SpO2 93%.    The patient is a well developed, [Well-nourished] [female] in no acute distress.    The patient's funduscopic examination shows no papilledema bilaterally.    The patient's extremity examination shows that the pulses are 2+ in the upper and lower extremities bilaterally and there is no edema in the lower extremities bilaterally.    The patient's mental status testing is alert and oriented ×3 with no evidence of aphasia or dysarthria.  The patient's memory testing, fund of knowledge " and concentration are all within normal limits.  The patient's cranial nerves 2, 3, 4, 5, 6, 7, 8, 9, 10, 11 and 12 are all within normal limits.  The patient's motor testing shows normal tone, bulk, and power in the upper and lower extremities bilaterally.  The patient's sensory testing is intact to light touch in the upper and lower extremities bilaterally.  The patient's cerebellar testing is intact in the upper and lower extremities bilaterally.  The patient's station and gait were not tested as the patient is a high fall risk.  The patient's reflexes are 1+ in the upper and lower extremities and symmetrical.    Relevant Results     Scheduled medications  aspirin, 81 mg, oral, Daily  busPIRone, 10 mg, oral, q12h  cetirizine, 10 mg, oral, Daily  clopidogrel, 75 mg, oral, Daily  enoxaparin, 40 mg, subcutaneous, q24h  FLUoxetine, 40 mg, oral, Daily  fluticasone furoate-vilanteroL, 1 puff, inhalation, Daily  hydroCHLOROthiazide, 25 mg, oral, Daily  lisinopril, 40 mg, oral, Daily  metoprolol tartrate, 25 mg, oral, BID  rosuvastatin, 20 mg, oral, Nightly  tiotropium, 2 puff, inhalation, Daily      Continuous medications     PRN medications  PRN medications: bisacodyl, bisacodyl, clonazePAM, guaiFENesin, melatonin, ondansetron **OR** ondansetron, traZODone    Results for orders placed or performed during the hospital encounter of 07/29/24 (from the past 96 hour(s))   CBC and Auto Differential   Result Value Ref Range    WBC 7.1 4.4 - 11.3 x10*3/uL    nRBC 0.0 0.0 - 0.0 /100 WBCs    RBC 4.14 4.00 - 5.20 x10*6/uL    Hemoglobin 12.5 12.0 - 16.0 g/dL    Hematocrit 37.7 36.0 - 46.0 %    MCV 91 80 - 100 fL    MCH 30.2 26.0 - 34.0 pg    MCHC 33.2 32.0 - 36.0 g/dL    RDW 12.5 11.5 - 14.5 %    Platelets 146 (L) 150 - 450 x10*3/uL    Neutrophils % 69.2 40.0 - 80.0 %    Immature Granulocytes %, Automated 0.3 0.0 - 0.9 %    Lymphocytes % 22.3 13.0 - 44.0 %    Monocytes % 6.4 2.0 - 10.0 %    Eosinophils % 1.4 0.0 - 6.0 %     Basophils % 0.4 0.0 - 2.0 %    Neutrophils Absolute 4.90 1.20 - 7.70 x10*3/uL    Immature Granulocytes Absolute, Automated 0.02 0.00 - 0.70 x10*3/uL    Lymphocytes Absolute 1.58 1.20 - 4.80 x10*3/uL    Monocytes Absolute 0.45 0.10 - 1.00 x10*3/uL    Eosinophils Absolute 0.10 0.00 - 0.70 x10*3/uL    Basophils Absolute 0.03 0.00 - 0.10 x10*3/uL   Comprehensive metabolic panel   Result Value Ref Range    Glucose 118 (H) 74 - 99 mg/dL    Sodium 138 136 - 145 mmol/L    Potassium 2.9 (LL) 3.5 - 5.3 mmol/L    Chloride 99 98 - 107 mmol/L    Bicarbonate 32 21 - 32 mmol/L    Anion Gap 10 10 - 20 mmol/L    Urea Nitrogen 17 6 - 23 mg/dL    Creatinine 0.82 0.50 - 1.05 mg/dL    eGFR 78 >60 mL/min/1.73m*2    Calcium 8.9 8.6 - 10.3 mg/dL    Albumin 3.9 3.4 - 5.0 g/dL    Alkaline Phosphatase 61 33 - 136 U/L    Total Protein 6.5 6.4 - 8.2 g/dL    AST 15 9 - 39 U/L    Bilirubin, Total 0.3 0.0 - 1.2 mg/dL    ALT 16 7 - 45 U/L   Lipase   Result Value Ref Range    Lipase 32 9 - 82 U/L   Troponin I, High Sensitivity   Result Value Ref Range    Troponin I, High Sensitivity 3 0 - 13 ng/L   Magnesium   Result Value Ref Range    Magnesium 1.87 1.60 - 2.40 mg/dL   ECG 12 lead   Result Value Ref Range    Ventricular Rate 60 BPM    Atrial Rate 60 BPM    DC Interval 185 ms    QRS Duration 105 ms    QT Interval 454 ms    QTC Calculation(Bazett) 454 ms    P Axis 45 degrees    R Axis 45 degrees    T Axis 50 degrees    QRS Count 10 beats    Q Onset 252 ms    T Offset 479 ms    QTC Fredericia 454 ms   CBC   Result Value Ref Range    WBC 7.5 4.4 - 11.3 x10*3/uL    nRBC 0.0 0.0 - 0.0 /100 WBCs    RBC 4.17 4.00 - 5.20 x10*6/uL    Hemoglobin 12.5 12.0 - 16.0 g/dL    Hematocrit 39.0 36.0 - 46.0 %    MCV 94 80 - 100 fL    MCH 30.0 26.0 - 34.0 pg    MCHC 32.1 32.0 - 36.0 g/dL    RDW 12.7 11.5 - 14.5 %    Platelets 136 (L) 150 - 450 x10*3/uL   Basic metabolic panel   Result Value Ref Range    Glucose 116 (H) 74 - 99 mg/dL    Sodium 142 136 - 145 mmol/L     Potassium 3.4 (L) 3.5 - 5.3 mmol/L    Chloride 103 98 - 107 mmol/L    Bicarbonate 33 (H) 21 - 32 mmol/L    Anion Gap 9 (L) 10 - 20 mmol/L    Urea Nitrogen 14 6 - 23 mg/dL    Creatinine 0.82 0.50 - 1.05 mg/dL    eGFR 78 >60 mL/min/1.73m*2    Calcium 8.5 (L) 8.6 - 10.3 mg/dL   Transthoracic Echo (TTE) Complete   Result Value Ref Range    LVOT diam 1.96 cm    LV Biplane EF 58 %    MV E/A ratio 1.11     Tricuspid annular plane systolic excursion 2.5 cm    AV mn grad 4.8 mmHg    LA vol index A/L 15.7 ml/m2    AV pk geraldo 1.49 m/s    LV EF 58 %    RV free wall pk S' 11.58 cm/s    RVSP 25.1 mmHg    LVIDd 4.39 cm    Aortic Valve Area by Continuity of VTI 2.15 cm2    Aortic Valve Area by Continuity of Peak Velocity 2.33 cm2    AV pk grad 8.9 mmHg    LV A4C EF 62.1        I have personally reviewed the following imaging results EEG    Result Date: 7/30/2024  IMPRESSION This awake and drowsy routine EEG is normal. No epileptiform discharges or lateralizing signs are seen. A full report will be scanned into the patient's chart at a later time. This report has been interpreted and electronically signed by    ECG 12 lead    Result Date: 7/30/2024  Sinus rhythm Low voltage, precordial leads    CT cervical spine wo IV contrast    Result Date: 7/29/2024  Interpreted By:  Graeme Guallpa, STUDY: CT CERVICAL SPINE WO IV CONTRAST;  7/29/2024 7:46 pm   INDICATION: Signs/Symptoms:unwitnessed fall.   COMPARISON: None.   ACCESSION NUMBER(S): [id number]   ORDERING CLINICIAN: GRAHAM KASPER   TECHNIQUE: Thin section axial images were obtained from the skull base down through the thoracic inlet. Sagittal and coronal reconstruction images were generated. Soft tissue, lung, and bone windows were reviewed.   FINDINGS: ALIGNMENT: CERVICAL LORDOSIS MAINTAINED. ATLANTOAXIAL INTERVAL IS MAINTAINED.   VERTEBRAL BODIES AND POSTERIOR ELEMENTS: No cervical spine compression fracture.  No posterior element fracture.  No destructive bone lesion. No  listhesis.   SPINAL CANAL: No gross disc herniation.   NECK SOFT TISSUES: Within normal limits.   LUNG APICES: Imaged portion of the lung apices are within normal limits.   SKULL BASE: Within normal limits.       NO ACUTE FRACTURE OR TRAUMATIC SUBLUXATION.   Signed by: Graeme Guallpa 7/29/2024 8:58 PM Dictation workstation:   [id number]    CT abdomen pelvis w IV contrast    Result Date: 7/29/2024  STUDY: CT Abdomen and Pelvis with IV Contrast; 7/29/2024 7:49 PM. INDICATION: Left flank pain.  Generalized abdominal pain.  Status post fall. COMPARISON: XR hip 7/29/2024.  CT chest 10/3/2022. ACCESSION NUMBER(S): [id number] ORDERING CLINICIAN: GRAHAM HOOD CONSING TECHNIQUE: CT of the abdomen and pelvis was performed.  Contiguous axial images were obtained at 3 mm slice thickness through the abdomen and pelvis. Coronal and sagittal reconstructions at 3 mm slice thickness were performed.  Omnipaque 350 75 mL was administered intravenously.  FINDINGS: LOWER CHEST: No cardiomegaly.  No pericardial effusion.  Lung bases are clear.  ABDOMEN:  LIVER: No hepatomegaly.  Smooth surface contour.  Normal attenuation.  BILE DUCTS: No intrahepatic or extrahepatic biliary ductal dilatation.  GALLBLADDER: The gallbladder is unremarkable. STOMACH: No abnormalities identified.  PANCREAS: No masses or ductal dilatation.  SPLEEN: No splenomegaly or focal splenic lesion.  ADRENAL GLANDS: No thickening or nodules.  KIDNEYS AND URETERS: Kidneys are normal in size and location.  No renal or ureteral calculi.  PELVIS:  BLADDER: No abnormalities identified.  REPRODUCTIVE ORGANS: No abnormalities identified.  BOWEL: There is diverticulosis.  The appendix is identified and is normal.  VESSELS: No abnormalities identified.  Abdominal aorta is normal in caliber.  PERITONEUM/RETROPERITONEUM/LYMPH NODES: No free fluid.  No pneumoperitoneum. No lymphadenopathy.  ABDOMINAL WALL: No abnormalities identified. SOFT TISSUES: No abnormalities identified.   BONES: There is bilateral spondylolysis resulting in spondylolisthesis at L5-S1.  This does result in spinal stenosis and narrowing of the neural foramina.  No acute fracture or aggressive osseous lesion.    No evidence for renal or ureteral calculi.  No evidence of hydronephrosis. Diverticulosis without diverticulitis. No acute process.. Signed by Joe Gaines MD    CT head wo IV contrast    Result Date: 7/29/2024  Interpreted By:  Graeme Guallpa, STUDY: CT HEAD WO IV CONTRAST;  7/29/2024 7:46 pm   INDICATION: Signs/Symptoms:unwitnessed fall.   COMPARISON: CT brain 12/08/2021   ACCESSION NUMBER(S): [id number]   ORDERING CLINICIAN: GRAHAM NUNEZING   TECHNIQUE: Noncontrast axial CT scan of head was performed.   FINDINGS: Parenchyma: There is no intracranial hemorrhage. The grey-white differentiation is intact. There is no mass effect or midline shift.   CSF Spaces: The ventricles, sulci and basal cisterns are within normal limits for age.   Extra-Axial Fluid: There is no extraaxial fluid collection.   Calvarium: The calvarium is unremarkable.   Paranasal sinuses: Visualized paranasal sinuses are clear.   Mastoids: Clear.   Orbits: Normal.   Soft tissues: Unremarkable.       No acute intracranial hemorrhage, mass effect, or CT apparent acute infarct.   Signed by: Graeme Guallpa 7/29/2024 8:13 PM Dictation workstation:   [id number]    XR hip left with pelvis when performed 2 or 3 views    Result Date: 7/29/2024  STUDY: Pelvis and Left Hip Radiographs; 7/29/2024 6:34 PM. INDICATION: Trauma, fall.  Left hip pain. COMPARISON: None. ACCESSION NUMBER(S): [id number] ORDERING CLINICIAN: GRAHAM HOOD CONSING TECHNIQUE:  AP view of the pelvis and 2 view(s) of the left hip. FINDINGS:  No acute fractures or dislocations are visualized within the left hip.  There is mild narrowing of the joint space.  No acute findings are seen within the iliac wings or pubic rami.    1.  No acute osseous findings involving the left hip.  2.  Mild degenerative changes. Signed by Anselmo Angel MD    XR chest 2 views    Result Date: 7/29/2024  STUDY: Chest Radiographs;  7/29/2024 6:34 PM. INDICATION: Syncopal episode with fall. COMPARISON: CXR 12/8/2021. ACCESSION NUMBER(S): [id number] ORDERING CLINICIAN: GRAHAM HOOD CONSING TECHNIQUE:  Frontal and lateral chest. FINDINGS: CARDIOMEDIASTINAL SILHOUETTE: Cardiomediastinal silhouette is normal in size and configuration.  LUNGS: Lungs are clear.  ABDOMEN: No remarkable upper abdominal findings.  BONES: No acute osseous changes.    No acute process. Signed by Geovanni Benson MD       Assessment/Plan   Impression: The patient is a 69-year-old female with multiple medical problems who had an episode where she fell in the middle of the night.  Her neurological examination is nonfocal but limited as the patient has an unsteady gait at baseline.  The differential diagnosis for her fall includes mechanical fall, orthostatic hypotension, cardiac arrhythmia, TIA and a seizure.    Plan: The patient needs an MRI of the brain without contrast as well as an MRA of the neck and brain.  The patient needs lipid panel and hemoglobin A1c.  The patient will need a Zio patch for 2 weeks.  The patient needs to stay well-hydrated.  The patient should continue her Plavix and aspirin and other medications.  The patient needs to continue stroke risk factor modification.   The patient needs a PT, OT, social service, rehab and speech therapy consult.  The patient needs DVT prophylaxis.  The patient needs neurochecks as per protocol.  I will send the note to Dr. Greco.  Thank you very much for sending me this very interesting consultation.  I discussed all these issues in detail with the patient and answered all their questions.  I will continue to follow the patient while they are in the hospital.  The patient needs follow-up with their primary care doctor within 2 weeks of discharge.  On discharge, the patient will follow up with  the neurology NP in the office on an as-needed basis.      Mac Anne MD

## 2024-07-31 ENCOUNTER — APPOINTMENT (OUTPATIENT)
Dept: RADIOLOGY | Facility: HOSPITAL | Age: 69
End: 2024-07-31
Payer: MEDICARE

## 2024-07-31 LAB
ANION GAP SERPL CALC-SCNC: 8 MMOL/L (ref 10–20)
BASOPHILS # BLD AUTO: 0.03 X10*3/UL (ref 0–0.1)
BASOPHILS NFR BLD AUTO: 0.5 %
BUN SERPL-MCNC: 18 MG/DL (ref 6–23)
CALCIUM SERPL-MCNC: 8.7 MG/DL (ref 8.6–10.3)
CHLORIDE SERPL-SCNC: 99 MMOL/L (ref 98–107)
CHOLEST SERPL-MCNC: 117 MG/DL (ref 0–199)
CHOLESTEROL/HDL RATIO: 3.7
CO2 SERPL-SCNC: 39 MMOL/L (ref 21–32)
CREAT SERPL-MCNC: 0.9 MG/DL (ref 0.5–1.05)
EGFRCR SERPLBLD CKD-EPI 2021: 70 ML/MIN/1.73M*2
EOSINOPHIL # BLD AUTO: 0.09 X10*3/UL (ref 0–0.7)
EOSINOPHIL NFR BLD AUTO: 1.4 %
ERYTHROCYTE [DISTWIDTH] IN BLOOD BY AUTOMATED COUNT: 12.4 % (ref 11.5–14.5)
EST. AVERAGE GLUCOSE BLD GHB EST-MCNC: 134 MG/DL
GLUCOSE SERPL-MCNC: 100 MG/DL (ref 74–99)
HBA1C MFR BLD: 6.3 %
HCT VFR BLD AUTO: 37.5 % (ref 36–46)
HDLC SERPL-MCNC: 31.5 MG/DL
HGB BLD-MCNC: 11.8 G/DL (ref 12–16)
IMM GRANULOCYTES # BLD AUTO: 0.02 X10*3/UL (ref 0–0.7)
IMM GRANULOCYTES NFR BLD AUTO: 0.3 % (ref 0–0.9)
LDLC SERPL CALC-MCNC: 28 MG/DL
LYMPHOCYTES # BLD AUTO: 1.74 X10*3/UL (ref 1.2–4.8)
LYMPHOCYTES NFR BLD AUTO: 27 %
MAGNESIUM SERPL-MCNC: 1.9 MG/DL (ref 1.6–2.4)
MCH RBC QN AUTO: 29.9 PG (ref 26–34)
MCHC RBC AUTO-ENTMCNC: 31.5 G/DL (ref 32–36)
MCV RBC AUTO: 95 FL (ref 80–100)
MONOCYTES # BLD AUTO: 0.6 X10*3/UL (ref 0.1–1)
MONOCYTES NFR BLD AUTO: 9.3 %
NEUTROPHILS # BLD AUTO: 3.97 X10*3/UL (ref 1.2–7.7)
NEUTROPHILS NFR BLD AUTO: 61.5 %
NON HDL CHOLESTEROL: 86 MG/DL (ref 0–149)
NRBC BLD-RTO: 0 /100 WBCS (ref 0–0)
PLATELET # BLD AUTO: 124 X10*3/UL (ref 150–450)
POTASSIUM SERPL-SCNC: 3.4 MMOL/L (ref 3.5–5.3)
RBC # BLD AUTO: 3.95 X10*6/UL (ref 4–5.2)
SODIUM SERPL-SCNC: 143 MMOL/L (ref 136–145)
TRIGL SERPL-MCNC: 289 MG/DL (ref 0–149)
VLDL: 58 MG/DL (ref 0–40)
WBC # BLD AUTO: 6.5 X10*3/UL (ref 4.4–11.3)

## 2024-07-31 PROCEDURE — 83036 HEMOGLOBIN GLYCOSYLATED A1C: CPT | Performed by: NURSE PRACTITIONER

## 2024-07-31 PROCEDURE — 83718 ASSAY OF LIPOPROTEIN: CPT | Performed by: NURSE PRACTITIONER

## 2024-07-31 PROCEDURE — G0408 INPT/TELE FOLLOW UP 35: HCPCS | Performed by: PSYCHIATRY & NEUROLOGY

## 2024-07-31 PROCEDURE — 85025 COMPLETE CBC W/AUTO DIFF WBC: CPT | Performed by: INTERNAL MEDICINE

## 2024-07-31 PROCEDURE — 70551 MRI BRAIN STEM W/O DYE: CPT | Performed by: RADIOLOGY

## 2024-07-31 PROCEDURE — 2500000004 HC RX 250 GENERAL PHARMACY W/ HCPCS (ALT 636 FOR OP/ED): Performed by: STUDENT IN AN ORGANIZED HEALTH CARE EDUCATION/TRAINING PROGRAM

## 2024-07-31 PROCEDURE — 97116 GAIT TRAINING THERAPY: CPT | Mod: GP,CQ

## 2024-07-31 PROCEDURE — 70551 MRI BRAIN STEM W/O DYE: CPT

## 2024-07-31 PROCEDURE — 70547 MR ANGIOGRAPHY NECK W/O DYE: CPT

## 2024-07-31 PROCEDURE — 2500000002 HC RX 250 W HCPCS SELF ADMINISTERED DRUGS (ALT 637 FOR MEDICARE OP, ALT 636 FOR OP/ED): Performed by: INTERNAL MEDICINE

## 2024-07-31 PROCEDURE — 83735 ASSAY OF MAGNESIUM: CPT | Performed by: INTERNAL MEDICINE

## 2024-07-31 PROCEDURE — 70544 MR ANGIOGRAPHY HEAD W/O DYE: CPT

## 2024-07-31 PROCEDURE — 2500000001 HC RX 250 WO HCPCS SELF ADMINISTERED DRUGS (ALT 637 FOR MEDICARE OP): Performed by: STUDENT IN AN ORGANIZED HEALTH CARE EDUCATION/TRAINING PROGRAM

## 2024-07-31 PROCEDURE — 70547 MR ANGIOGRAPHY NECK W/O DYE: CPT | Performed by: RADIOLOGY

## 2024-07-31 PROCEDURE — 80048 BASIC METABOLIC PNL TOTAL CA: CPT | Performed by: INTERNAL MEDICINE

## 2024-07-31 PROCEDURE — 97530 THERAPEUTIC ACTIVITIES: CPT | Mod: GO,CO

## 2024-07-31 PROCEDURE — 1200000002 HC GENERAL ROOM WITH TELEMETRY DAILY

## 2024-07-31 PROCEDURE — 2500000002 HC RX 250 W HCPCS SELF ADMINISTERED DRUGS (ALT 637 FOR MEDICARE OP, ALT 636 FOR OP/ED): Performed by: STUDENT IN AN ORGANIZED HEALTH CARE EDUCATION/TRAINING PROGRAM

## 2024-07-31 PROCEDURE — 99233 SBSQ HOSP IP/OBS HIGH 50: CPT | Performed by: INTERNAL MEDICINE

## 2024-07-31 PROCEDURE — 97110 THERAPEUTIC EXERCISES: CPT | Mod: GP,CQ

## 2024-07-31 PROCEDURE — 36415 COLL VENOUS BLD VENIPUNCTURE: CPT | Performed by: INTERNAL MEDICINE

## 2024-07-31 PROCEDURE — G0378 HOSPITAL OBSERVATION PER HR: HCPCS

## 2024-07-31 PROCEDURE — 80061 LIPID PANEL: CPT | Performed by: NURSE PRACTITIONER

## 2024-07-31 RX ORDER — POTASSIUM CHLORIDE 750 MG/1
40 TABLET, FILM COATED, EXTENDED RELEASE ORAL 2 TIMES DAILY
Status: COMPLETED | OUTPATIENT
Start: 2024-07-31 | End: 2024-07-31

## 2024-07-31 RX ADMIN — TIOTROPIUM BROMIDE INHALATION SPRAY 2 PUFF: 3.12 SPRAY, METERED RESPIRATORY (INHALATION) at 08:37

## 2024-07-31 RX ADMIN — TRAZODONE HYDROCHLORIDE 50 MG: 50 TABLET ORAL at 21:01

## 2024-07-31 RX ADMIN — METOPROLOL TARTRATE 25 MG: 25 TABLET, FILM COATED ORAL at 21:01

## 2024-07-31 RX ADMIN — ENOXAPARIN SODIUM 40 MG: 40 INJECTION SUBCUTANEOUS at 08:36

## 2024-07-31 RX ADMIN — CETIRIZINE HYDROCHLORIDE 10 MG: 10 TABLET, FILM COATED ORAL at 08:36

## 2024-07-31 RX ADMIN — FLUOXETINE HYDROCHLORIDE 40 MG: 20 CAPSULE ORAL at 08:36

## 2024-07-31 RX ADMIN — BUSPIRONE HYDROCHLORIDE 10 MG: 5 TABLET ORAL at 21:01

## 2024-07-31 RX ADMIN — LISINOPRIL 40 MG: 20 TABLET ORAL at 08:36

## 2024-07-31 RX ADMIN — FLUTICASONE FUROATE AND VILANTEROL TRIFENATATE 1 PUFF: 200; 25 POWDER RESPIRATORY (INHALATION) at 08:36

## 2024-07-31 RX ADMIN — BUSPIRONE HYDROCHLORIDE 10 MG: 5 TABLET ORAL at 08:36

## 2024-07-31 RX ADMIN — HYDROCHLOROTHIAZIDE 25 MG: 25 TABLET ORAL at 08:36

## 2024-07-31 RX ADMIN — METOPROLOL TARTRATE 25 MG: 25 TABLET, FILM COATED ORAL at 08:36

## 2024-07-31 RX ADMIN — ASPIRIN 81 MG: 81 TABLET, COATED ORAL at 08:36

## 2024-07-31 RX ADMIN — ROSUVASTATIN 20 MG: 20 TABLET, FILM COATED ORAL at 21:01

## 2024-07-31 RX ADMIN — CLOPIDOGREL 75 MG: 75 TABLET ORAL at 08:36

## 2024-07-31 RX ADMIN — POTASSIUM CHLORIDE 40 MEQ: 750 TABLET, FILM COATED, EXTENDED RELEASE ORAL at 13:06

## 2024-07-31 RX ADMIN — POTASSIUM CHLORIDE 40 MEQ: 750 TABLET, FILM COATED, EXTENDED RELEASE ORAL at 21:01

## 2024-07-31 ASSESSMENT — PAIN - FUNCTIONAL ASSESSMENT
PAIN_FUNCTIONAL_ASSESSMENT: 0-10
PAIN_FUNCTIONAL_ASSESSMENT: 0-10

## 2024-07-31 ASSESSMENT — COGNITIVE AND FUNCTIONAL STATUS - GENERAL
MOBILITY SCORE: 18
MOVING TO AND FROM BED TO CHAIR: A LITTLE
WALKING IN HOSPITAL ROOM: A LITTLE
MOBILITY SCORE: 18
PERSONAL GROOMING: A LITTLE
MOVING FROM LYING ON BACK TO SITTING ON SIDE OF FLAT BED WITH BEDRAILS: A LITTLE
DAILY ACTIVITIY SCORE: 19
STANDING UP FROM CHAIR USING ARMS: A LITTLE
DRESSING REGULAR LOWER BODY CLOTHING: A LITTLE
CLIMB 3 TO 5 STEPS WITH RAILING: A LITTLE
STANDING UP FROM CHAIR USING ARMS: A LITTLE
DRESSING REGULAR LOWER BODY CLOTHING: A LITTLE
CLIMB 3 TO 5 STEPS WITH RAILING: A LITTLE
DAILY ACTIVITIY SCORE: 19
WALKING IN HOSPITAL ROOM: A LITTLE
TURNING FROM BACK TO SIDE WHILE IN FLAT BAD: A LITTLE
TURNING FROM BACK TO SIDE WHILE IN FLAT BAD: A LITTLE
DRESSING REGULAR UPPER BODY CLOTHING: A LITTLE
HELP NEEDED FOR BATHING: A LITTLE
TOILETING: A LITTLE
PERSONAL GROOMING: A LITTLE
MOVING TO AND FROM BED TO CHAIR: A LITTLE
DRESSING REGULAR UPPER BODY CLOTHING: A LITTLE
TOILETING: A LITTLE
MOVING FROM LYING ON BACK TO SITTING ON SIDE OF FLAT BED WITH BEDRAILS: A LITTLE
HELP NEEDED FOR BATHING: A LITTLE

## 2024-07-31 ASSESSMENT — PAIN SCALES - GENERAL
PAINLEVEL_OUTOF10: 0 - NO PAIN
PAINLEVEL_OUTOF10: 2
PAINLEVEL_OUTOF10: 0 - NO PAIN

## 2024-07-31 NOTE — CARE PLAN
The patient's goals for the shift include      The clinical goals for the shift include patient will not have any respiratory issues throughout this shift.

## 2024-07-31 NOTE — PROGRESS NOTES
"Amy Montague is a 69 y.o. female on day 1 of admission presenting with Syncope and collapse.      Subjective   Amy Montague is a 69 y.o. female with PMHx s/f CAD w stents on DAPT, COPD on 2 L chronically, PRES, HLD, FADIA, HTN, anxiety/depression presenting with fall and syncope.Pt says two days ago she might have tripped over her O2 tank while going to the bathroom in the middle of the night.She woke up on the floor after and indeterminate amount of time not remember what happened. Denies head trauma. Since then she has been having L hip and back pain. No other recent falls or lightheadedness or syncope. No nausea, vomiting, worsening shortness of breath, chest pain, abdominal pain, or leg swelling. She is having issues walking. No urinary or bowel problems, weakness in legs, or other symptoms. She says she has been taking all of her medications as prescribed. No stroke like symptoms or focal neurological deficit. States she still smokes tobacco \"occasionally.\"     ED Course (Summary):   Vitals on presentation: 98.2 F, 60 bpm, 19 rr, 124/68, 89% on ? --> 95% on 2.5 L NC  Labs: CMP glu 118, K 2.9  Mag 1.87  Lipase 32  Trop 3  CBC unremarkable  Imaging: XR hip - 1.  No acute osseous findings involving the left hip.   2.  Mild degenerative changes.   CXR - No acute process.   CT cervical - NO ACUTE FRACTURE OR TRAUMATIC SUBLUXATION.   CT head - No acute intracranial hemorrhage, mass effect, or CT apparent acute   infarct.   CT a/p w contrast - No evidence for renal or ureteral calculi.  No evidence of   hydronephrosis.   Diverticulosis without diverticulitis.   No acute process..   Interventions: morphine 4 mg X1, Zofran 4 mg X1, Klor con 30 meq X1    7/30: Patient was seen and examined.  She was awake alert and interactive.  Echocardiogram is still pending.  Orthostatic blood pressure done was positive.  Will continue IV fluids.  Will repeat orthostatic and consider LR bolus if persistent.  She denies any history " of seizures however in the setting of PRES I will order EEG.  Neurologist consulted.  7/31:Patient was seen and examined.  Has no new complaints today.  Still awake alert and interactive.  Echocardiogram does show an ejection fraction of 58% with normal right ventricular global systolic function and no significant valvular abnormalities.  EEG done and pending.  Seen by neurologist and MRI of the brain ordered.    Objective     Last Recorded Vitals  /68 (BP Location: Right arm, Patient Position: Lying)   Pulse 65   Temp 36.8 °C (98.2 °F) (Temporal)   Resp 14   Wt 59.8 kg (131 lb 13.4 oz)   SpO2 97%   Intake/Output last 3 Shifts:    Intake/Output Summary (Last 24 hours) at 7/31/2024 1037  Last data filed at 7/31/2024 0957  Gross per 24 hour   Intake 1293.75 ml   Output --   Net 1293.75 ml       Admission Weight  Weight: 59.8 kg (131 lb 13.4 oz) (07/29/24 1729)    Daily Weight  07/29/24 : 59.8 kg (131 lb 13.4 oz)    Image Results  ECG 12 lead  Sinus rhythm  Low voltage, precordial leads    See ED provider note for full interpretation and clinical correlation  Confirmed by Humera Erickson ([id number]) on 7/30/2024 9:11:57 PM  Transthoracic Echo (TTE) [place] [address]       Phone [phone] Fax [phone]    TRANSTHORACIC ECHOCARDIOGRAM REPORT    Patient Name:      CLINTON MACHADO ANTELMO     Reading Physician:    [id number] Emil Lai MD  Study Date:        7/30/2024           Ordering Provider:    [id number] TANJA HOLLINGSWORTH  MRN/PID:           [id number]            Fellow:  Accession#:        [id number]        Nurse:                Luz Parsons  Date of Birth/Age: 1955 / 69      Sonographer:          Nafisa Bae RDCS                     years  Gender:            F                   Additional Staff:  Height:            154.94 cm           Admit Date:           7/29/2024  Weight:             59.42 kg            Admission Status:     Inpatient - Routine  BSA / BMI:         1.58 m2 / 24.75     Department Location:  [place] [address] [address]                     kg/m2  Blood Pressure: 132 /77 mmHg    Study Type:    TRANSTHORACIC ECHO (TTE) COMPLETE  Diagnosis/ICD: Other forms of dyspnea-R06.09; Syncope-R55  Indication:    Dyspnea, Syncope  CPT Codes:     Echo Complete w Full [id number]    Patient History:  Pertinent History: CAD and HTN.    Study Detail: The following Echo studies were performed: 2D, M-Mode, Doppler and                color flow. Optison used as a contrast agent for endocardial                border definition. Total contrast used for this procedure was 2 mL                via IV push.       PHYSICIAN INTERPRETATION:  Left Ventricle: The left ventricular systolic function is normal, with a Feliz's biplane calculated ejection fraction of 58%. There are no regional wall motion abnormalities. The left ventricular cavity size is normal. Spectral Doppler shows a normal pattern of left ventricular diastolic filling.  Left Atrium: The left atrium is normal in size.  Right Ventricle: The right ventricle is normal in size. There is normal right ventricular global systolic function.  Right Atrium: The right atrium is normal in size.  Aortic Valve: The aortic valve appears structurally normal. The aortic valve dimensionless index is 0.71. There is trivial aortic valve regurgitation. The peak instantaneous gradient of the aortic valve is 8.9 mmHg. The mean gradient of the aortic valve is 4.8 mmHg.  Mitral Valve: The mitral valve is normal in structure. There is trace mitral valve regurgitation.  Tricuspid Valve: The tricuspid valve is structurally normal. There is trace tricuspid regurgitation.  Pulmonic Valve: The pulmonic valve is not well visualized. There is physiologic pulmonic valve regurgitation.  Pericardium: There is no pericardial effusion noted.  Aorta: The aortic root is normal.        CONCLUSIONS:   1. The left ventricular systolic function is normal, with a Feliz's biplane calculated ejection fraction of 58%.   2. There is normal right ventricular global systolic function.    QUANTITATIVE DATA SUMMARY:  2D MEASUREMENTS:                           Normal Ranges:  LAs:           3.46 cm   (2.7-4.0cm)  IVSd:          0.85 cm   (0.6-1.1cm)  LVPWd:         0.94 cm   (0.6-1.1cm)  LVIDd:         4.39 cm   (3.9-5.9cm)  LVIDs:         3.14 cm  LV Mass Index: 79.9 g/m2  LV % FS        28.6 %    LA VOLUME:                                Normal Ranges:  LA Vol A4C:        20.6 ml    (22+/-6mL/m2)  LA Vol A2C:        28.6 ml  LA Vol BP:         24.8 ml  LA Vol Index A4C:  13.0ml/m2  LA Vol Index A2C:  18.1 ml/m2  LA Vol Index BP:   15.7 ml/m2  LA Area A4C:       10.2 cm2  LA Area A2C:       12.3 cm2  LA Major Axis A4C: 4.3 cm  LA Major Axis A2C: 4.5 cm  LA Vol A4C:        20.6 ml  LA Vol A2C:        27.9 ml    RA VOLUME BY A/L METHOD:                        Normal Ranges:  RA Area A4C: 13.2 cm2    AORTA MEASUREMENTS:                       Normal Ranges:  Ao Sinus, d: 2.80 cm (2.1-3.5cm)  Ao STJ, d:   1.90 cm (1.7-3.4cm)  Asc Ao, d:   3.20 cm (2.1-3.4cm)    LV SYSTOLIC FUNCTION BY 2D PLANIMETRY (MOD):                       Normal Ranges:  EF-A4C View:    62 % (>=55%)  EF-A2C View:    54 %  EF-Biplane:     58 %  LV EF Reported: 58 %    LV DIASTOLIC FUNCTION:                            Normal Ranges:  MV Peak E:    0.89 m/s    (0.7-1.2 m/s)  MV Peak A:    0.80 m/s    (0.42-0.7 m/s)  E/A Ratio:    1.11        (1.0-2.2)  MV e'         0.094 m/s   (>8.0)  MV lateral e' 0.09 m/s  MV medial e'  0.10 m/s  MV A Dur:     125.59 msec  E/e' Ratio:   9.47        (<8.0)    MITRAL VALVE:                  Normal Ranges:  MV DT: 195 msec (150-240msec)    AORTIC VALVE:                                    Normal Ranges:  AoV Vmax:                1.49 m/s (<=1.7m/s)  AoV Peak P.9 mmHg (<20mmHg)  AoV Mean PG:              4.8 mmHg (1.7-11.5mmHg)  LVOT Max Teofilo:            1.15 m/s (<=1.1m/s)  AoV VTI:                 35.35 cm (18-25cm)  LVOT VTI:                25.13 cm  LVOT Diameter:           1.96 cm  (1.8-2.4cm)  AoV Area, VTI:           2.15 cm2 (2.5-5.5cm2)  AoV Area,Vmax:           2.33 cm2 (2.5-4.5cm2)  AoV Dimensionless Index: 0.71       RIGHT VENTRICLE:  RV Basal 3.19 cm  RV Mid   2.80 cm  RV Major 6.5 cm  TAPSE:   24.6 mm  RV s'    0.12 m/s    TRICUSPID VALVE/RVSP:                              Normal Ranges:  Peak TR Velocity: 2.35 m/s  RV Syst Pressure: 25.1 mmHg (< 30mmHg)  IVC Diam:         1.48 cm    AORTA:  Asc Ao Diam 3.18 cm       [id number] Emil Lai MD  Electronically signed on 7/30/2024 at 4:41:08 PM       ** Final **  EEG  IMPRESSION    This awake and drowsy routine EEG is normal. No epileptiform discharges or lateralizing signs are seen.    A full report will be scanned into the patient's chart at a later time.    This report has been interpreted and electronically signed by      Physical Exam    Vitals:    07/31/24 0956   BP: 147/68   Pulse: 65   Resp: 14   Temp: 36.8 °C (98.2 °F)   SpO2: 97%        Constitutional: Pleasant and cooperative. Laying in bed in no acute distress. Conversant.   Skin: Warm and dry; no obvious lesions, rashes, pallor, or jaundice. Good turgor.   Eyes: EOMI. Anicteric sclera.   ENT: Mucous membranes moist; no obvious injury or deformity appreciated.   Head and Neck: Normocephalic, atraumatic. ROM preserved. Trachea midline. No appreciable JVD.   Respiratory: Nonlabored on 2.5 L NC. Lungs clear to auscultation bilaterally without obvious adventitious sounds. Chest rise is equal.  Cardiovascular: RRR. No gross murmur, gallop, or rub. Extremities are warm and well-perfused with good capillary refill (< 3 seconds). No chest wall tenderness.   GI: Abdomen soft, nontender, nondistended. No obvious organomegaly appreciated. Bowel sounds are present and normoactive.  : No CVA  tenderness.   MSK: No gross abnormalities appreciated. No limitations to AROM/PROM appreciated.   Extremities: No cyanosis, edema, or clubbing evident. Neurovascularly intact.   Neuro: A&Ox3. CN 2-12 grossly intact. Able to respond to questions appropriately and clearly. No acute focal neurologic deficits appreciated.  Psych: Appropriate mood and behavior.     Relevant Results               Assessment/Plan                  Principal Problem:    Syncope and collapse  Active Problems:    Anxiety and depression    FADIA (generalized anxiety disorder)    CAD (coronary atherosclerotic disease)    Chronic respiratory failure with hypoxia, on home O2 therapy (Multi)    Hyperlipidemia    Essential hypertension    COPD, moderate (Multi)    Hypokalemia    Left hip pain    Fall/syncope ?Seizures  In the setting of history of PRES  CT of the head was negative for any acute process  Continue gentle hydration  Echocardiogram shows ejection fraction of 58%  EEG pending  Orthostatics positive.  IV fluid given  Repeat orthostatics pending  MRI/MRA pending  Neurologist on board      Ambulation issues, hip pain - PT/OT consulted  Imaging negative for fractures or acute findings.    Hypokalemia  P.o. potassium supplementation given  Trend BMP daily     CAD:  Home ASA and Plavix  Home Lopressor  Home statin     Psych:  Home Buspar, Prozac     COPD:  Home inhalers     HTN:  Home lisinopril and HCTZ     Diet: Cardiac  DVT Prophylaxis: Lovenox SQ  Code Status: DNR/DNI per discussion with pt on admission toda       Spent 35 minutes in the follow-up management of this patient today       Stacie Greco MD

## 2024-07-31 NOTE — PROGRESS NOTES
Amy Montague is a 69 y.o. female admitted for Syncope and collapse. Pharmacy reviewed the patient's [id number] medications and allergies for accuracy.    The list below reflects the PTA list prior to pharmacy medication history. A summary a changes to the PTA medication list has been listed below. Please review each medication in order reconciliation for additional clarification and justification.    Source of information: T2P    Medications added:    Medications modified:    Medications to be removed:  ALBUTEROL AEROSOL INHALER   BUSPAR 10MG   ADVAIR DISKUS 250-50 INHALER      Medications of concern:      Prior to Admission Medications   Prescriptions Last Dose Informant Patient Reported? Taking?   FLUoxetine (PROzac) 40 mg capsule 2024  Yes Yes   Sig: TAKE 1 CAPSULE BY MOUTH EVERY DAY FOR 30 DAYS   Spiriva with HandiHaler 18 mcg inhalation capsule 2024  Yes Yes   Si capsule (18 mcg) once daily at bedtime.   acetaminophen (Tylenol Extra Strength) 500 mg tablet Unknown  Yes No   Sig: Take 1 tablet (500 mg) by mouth. PRN   albuterol (ProAir HFA) 90 mcg/actuation inhaler 2024  No Yes   Sig: Inhale 2 puffs every 4 hours if needed for wheezing or shortness of breath.   albuterol (ProAir RespiClick) 90 mcg/actuation aerosol powdr breath activated inhaler 2024  Yes Yes   Sig: Inhale 1 puff 2 times a day. PRN   albuterol 0.63 mg/3 mL nebulizer solution 2024  Yes Yes   Sig: Take 3 mL (0.63 mg) by nebulization every 4 hours if needed for wheezing.   aspirin 81 mg EC tablet 2024  Yes Yes   Sig: Take 1 tablet (81 mg) by mouth once daily.   busPIRone (Buspar) 10 mg tablet 2024  Yes Yes   Sig: Take 1 tablet (10 mg) by mouth every 12 hours.   cetirizine (ZyrTEC) 10 mg tablet 2024  Yes Yes   Sig: Take 1 tablet (10 mg) by mouth once daily.   clonazePAM (KlonoPIN) 1 mg tablet 2024  Yes Yes   Sig: Take 1 tablet (1 mg) by mouth 2 times a day as needed.   clopidogrel (Plavix)  75 mg tablet 7/29/2024  Yes Yes   Sig: Take 1 tablet (75 mg) by mouth once daily.   fluticasone (Flonase) 50 mcg/actuation nasal spray 7/29/2024  Yes Yes   Sig: Administer 1 spray into each nostril once daily. Shake gently. Before first use, prime pump. After use, clean tip and replace cap.   fluticasone propion-salmeteroL (Advair Diskus) 250-50 mcg/dose diskus inhaler 7/29/2024  No Yes   Sig: Inhale 1 puff 2 times a day. Rinse mouth with water after use to reduce aftertaste and incidence of candidiasis. Do not swallow.   hydroCHLOROthiazide (HYDRODiuril) 25 mg tablet 7/29/2024  Yes Yes   Sig: Take 1 tablet (25 mg) by mouth once daily.   lisinopril 40 mg tablet 7/29/2024  Yes Yes   Sig: Take 1 tablet (40 mg) by mouth once daily.   metoprolol tartrate (Lopressor) 25 mg tablet 7/29/2024  Yes Yes   Sig: Take by mouth twice a day.   omeprazole (PriLOSEC) 40 mg DR capsule 7/29/2024  Yes Yes   Sig: TAKE 1 CAPSULE BY MOUTH EVERY DAY 30 MINUTES BEFORE MORNING MEAL   oxygen (O2) gas therapy 7/29/2024  Yes Yes   Sig: Oxygen   Refills: 0        Start : 5-Nov-2020   Active   rosuvastatin (Crestor) 20 mg tablet 7/28/2024  Yes No   Sig: Take 1 tablet (20 mg) by mouth once daily.   traZODone (Desyrel) 50 mg tablet 7/28/2024  Yes No   Sig: Take 1 tablet (50 mg) by mouth as needed at bedtime.      Facility-Administered Medications: None       RICK LACEY

## 2024-07-31 NOTE — PROGRESS NOTES
Physical Therapy    Physical Therapy Treatment    Patient Name: Amy Montague  MRN: [id number]  Today's Date: 7/31/2024  Time Calculation  Start Time: 1700  Stop Time: 1723  Time Calculation (min): 23 min    Assessment/Plan   PT Assessment  End of Session Communication: Bedside nurse  End of Session Patient Position: Up in chair, Alarm off, not on at start of session     PT Plan  Treatment/Interventions: Bed mobility, Transfer training, Gait training, Stair training, Balance training, Strengthening, Endurance training, Therapeutic exercise, Therapeutic activity, Home exercise program  PT Plan: Ongoing PT  PT Frequency: 4 times per week  PT Discharge Recommendations: Low intensity level of continued care  Equipment Recommended upon Discharge:  (TBD)  PT - OK to Discharge: Yes (when medically cleared)    General Visit Information:      General  Missed Visit Reason:  (15:22- pt out of room in MRI; 16:45- pt eating dinner)  Prior to Session Communication: Bedside nurse  Patient Position Received: Up in chair, Alarm off, not on at start of session    Subjective   Precautions:  Precautions  Medical Precautions: Oxygen therapy device and L/min    Objective   Pain:  Pain Assessment  Pain Assessment: 0-10  0-10 (Numeric) Pain Score: 0 - No pain  Cognition:  Cognition  Overall Cognitive Status: Within Functional Limits    Treatments:  Therapeutic Exercise  Therapeutic Exercise Performed: Yes  Therapeutic Exercise Activity 1: pt completed seated LE exercises: LAQ x 15 reps, hip flexion x 15 reps, hip adduction x 15 reps    Ambulation/Gait Training  Ambulation/Gait Training Performed: Yes  Ambulation/Gait Training 1  Surface 1: Level tile  Device 1: No device  Assistance 1: Close supervision  Quality of Gait 1: Decreased step length (fast mariely)  Comments/Distance (ft) 1: 350 feet  Transfers  Transfer: Yes  Transfer 1  Technique 1: Sit to stand, Stand to sit  Transfer Device 1:  (no device)  Transfer Level of Assistance 1:  Close supervision    Outcome Measures:  [facility] Basic Mobility  Turning from your back to your side while in a flat bed without using bedrails: A little  Moving from lying on your back to sitting on the side of a flat bed without using bedrails: A little  Moving to and from bed to chair (including a wheelchair): A little  Standing up from a chair using your arms (e.g. wheelchair or bedside chair): A little  To walk in hospital room: A little  Climbing 3-5 steps with railing: A little  Basic Mobility - Total Score: 18    Education Documentation  Precautions, taught by Rose Marie Wiggins PTA at 7/31/2024  5:29 PM.  Learner: Patient  Readiness: Acceptance  Method: Explanation  Response: Verbalizes Understanding    Mobility Training, taught by Rose Marie Wiggins PTA at 7/31/2024  5:29 PM.  Learner: Patient  Readiness: Acceptance  Method: Explanation  Response: Verbalizes Understanding    Education Comments  No comments found.        OP EDUCATION:       Encounter Problems       Encounter Problems (Active)       PT Problem       transfers (Progressing)       Start:  07/30/24    Expected End:  08/13/24       Patient will perform all transfers with SBA x1, good safety awareness           gait (Progressing)       Start:  07/30/24    Expected End:  08/13/24       Patient will amb 100+ feet with SBA x1, no LOB, FWW vs cane prn          strengthening  (Progressing)       Start:  07/30/24    Expected End:  08/13/24       Patient will perform 20+ reps of AROM/RROM for URMILA LE's to improve safety and functional independence              Pain - Adult

## 2024-07-31 NOTE — PROGRESS NOTES
[facility] Progress Note    Amy Montague is a 69 y.o. female on day 1 of admission presenting with Syncope and collapse.  Subjective   Pt seen again this afternoon. Resting in bed. No distress. No further issues or episodes since coming in.        Objective     Vitals:    07/31/24 1047 07/31/24 1049 07/31/24 1050 07/31/24 1330   BP: 129/65 135/80 128/72 134/68   BP Location: Right arm Right arm Right arm Right arm   Patient Position: Lying Sitting Standing Standing   Pulse: 63 61 62 57   Resp: 16 16 16 12   Temp: 36.7 °C (98 °F) 36.7 °C (98 °F) 36.7 °C (98 °F) 36.6 °C (97.9 °F)   TempSrc: Temporal Temporal Temporal Temporal   SpO2: 93% 93% 93% 97%   Weight:       Height:             Physical Exam  Vitals reviewed.   Psychiatric:         Speech: Speech normal.       Neurological Exam  Mental Status  Awake, alert and oriented to person, place and time. Speech is normal. Language is fluent with no aphasia.    Cranial Nerves  CN II-XII grossly intact.    Motor  Normal muscle bulk throughout. No fasciculations present. Normal muscle tone. No abnormal involuntary movements.  Strength at least antigravity throughout.      Scheduled medications  aspirin, 81 mg, oral, Daily  busPIRone, 10 mg, oral, q12h  cetirizine, 10 mg, oral, Daily  clopidogrel, 75 mg, oral, Daily  enoxaparin, 40 mg, subcutaneous, q24h  FLUoxetine, 40 mg, oral, Daily  fluticasone furoate-vilanteroL, 1 puff, inhalation, Daily  hydroCHLOROthiazide, 25 mg, oral, Daily  lisinopril, 40 mg, oral, Daily  metoprolol tartrate, 25 mg, oral, BID  potassium chloride CR, 40 mEq, oral, BID  rosuvastatin, 20 mg, oral, Nightly  tiotropium, 2 puff, inhalation, Daily      Continuous medications     PRN medications  PRN medications: bisacodyl, bisacodyl, clonazePAM, guaiFENesin, melatonin, ondansetron **OR** ondansetron, traZODone     Lab Results   Component Value Date    WBC 6.5 07/31/2024    RBC 3.95 (L) 07/31/2024    HGB 11.8 (L) 07/31/2024    HCT 37.5  07/31/2024     (L) 07/31/2024     07/31/2024    K 3.4 (L) 07/31/2024    CL 99 07/31/2024    BUN 18 07/31/2024    CREATININE 0.90 07/31/2024    EGFR 70 07/31/2024    CALCIUM 8.7 07/31/2024    ALKPHOS 61 07/29/2024    AST 15 07/29/2024    ALT 16 07/29/2024    MG 1.90 07/31/2024    HGBA1C 6.3 (H) 07/31/2024    LDLCALC 28 07/31/2024    CHOL 117 07/31/2024    HDL 31.5 07/31/2024    TRIG 289 (H) 07/31/2024       Below CT and MRI images and reports have been personally reviewed in PACS, agree with interpretations.    EEG 07/30/2024    Impression  IMPRESSION    This awake and drowsy routine EEG is normal. No epileptiform discharges or lateralizing signs are seen.    A full report will be scanned into the patient's chart at a later time.      This report has been interpreted and electronically signed by      CT cervical spine wo IV contrast 07/29/2024    Narrative  Interpreted By:  Graeme Guallpa,  STUDY:  CT CERVICAL SPINE WO IV CONTRAST;  7/29/2024 7:46 pm    INDICATION:  Signs/Symptoms:unwitnessed fall.    COMPARISON:  None.    ACCESSION NUMBER(S):  [id number]    ORDERING CLINICIAN:  GRAHAM KASPER    TECHNIQUE:  Thin section axial images were obtained from the skull base down  through the thoracic inlet. Sagittal and coronal reconstruction  images were generated. Soft tissue, lung, and bone windows were  reviewed.    FINDINGS:  ALIGNMENT: CERVICAL LORDOSIS MAINTAINED. ATLANTOAXIAL INTERVAL IS  MAINTAINED.    VERTEBRAL BODIES AND POSTERIOR ELEMENTS: No cervical spine  compression fracture.  No posterior element fracture.  No destructive  bone lesion. No listhesis.    SPINAL CANAL: No gross disc herniation.    NECK SOFT TISSUES: Within normal limits.    LUNG APICES: Imaged portion of the lung apices are within normal  limits.    SKULL BASE: Within normal limits.    Impression  NO ACUTE FRACTURE OR TRAUMATIC SUBLUXATION.    Signed by: Graeme Guallpa 7/29/2024 8:58 PM  Dictation workstation:    [id number]      CT head wo IV contrast 07/29/2024    Narrative  Interpreted By:  Graeme Guallpa,  STUDY:  CT HEAD WO IV CONTRAST;  7/29/2024 7:46 pm    INDICATION:  Signs/Symptoms:unwitnessed fall.    COMPARISON:  CT brain 12/08/2021    ACCESSION NUMBER(S):  [id number]    ORDERING CLINICIAN:  GRAHAM KASPER    TECHNIQUE:  Noncontrast axial CT scan of head was performed.    FINDINGS:  Parenchyma: There is no intracranial hemorrhage. The grey-white  differentiation is intact. There is no mass effect or midline shift.    CSF Spaces: The ventricles, sulci and basal cisterns are within  normal limits for age.    Extra-Axial Fluid: There is no extraaxial fluid collection.    Calvarium: The calvarium is unremarkable.    Paranasal sinuses: Visualized paranasal sinuses are clear.    Mastoids: Clear.    Orbits: Normal.    Soft tissues: Unremarkable.    Impression  No acute intracranial hemorrhage, mass effect, or CT apparent acute  infarct.    Signed by: Graeme Guallpa 7/29/2024 8:13 PM  Dictation workstation:   [id number]                 Charly Coma Scale  Best Eye Response: Spontaneous  Best Verbal Response: Oriented  Best Motor Response: Follows commands  Charly Coma Scale Score: 15        Assessment/Plan      Principal Problem:    Syncope and collapse  Active Problems:    Anxiety and depression    FADIA (generalized anxiety disorder)    CAD (coronary atherosclerotic disease)    Chronic respiratory failure with hypoxia, on home O2 therapy (Multi)    Hyperlipidemia    Essential hypertension    COPD, moderate (Multi)    Hypokalemia    Left hip pain      IMPRESSION:  Amy Montague is a 69 y.o. female with history of CAD, COPD, PRES, HLD, FADIA and HTN presented for fall with LOC at home. Thinks she tripped over her O2 while getting up to bathroom during the night. Woke up on the floor. On DAPT at baseline.   HCT without any acute findings  CT c-spine - no acute findings  A1C 6.3%  Lipids unremarkable other than  elevated triglycerides    Fall  LOC  DDx includes mechanical fall with LOC due to injury vs syncope vs seizure vs CVA/TIA      RECOMMENDATIONS:  Continue supportive care  MRI brain wo contrast pending  MRA head/neck pending  Continue telemetry, holter monitor on discharge x 14 days, ordered.   Fall precautions.   Continue home DAPT and statin.     Discussed with patient. All questions answered.   Seen with Dr. Anne virtually via telehealth.   If MRI/MRAs look ok, then can discharge from neurology standpoint.       I personally spent 55 minutes today, exclusive of procedures, providing care for this patient, including preparation, face to face time, documentation and other services such as review of medical records, diagnostic result, patient education, counseling, coordination of care as specified in the encounter.    Aubree Jain, APRN-CNP    I agree with the above.  I saw and examined the patient with the NP and was present for the entirety of the clinical encounter.  The note above has been edited as appropriate.  The plan of care was mostly mine with input from the NP.  The patient is doing well and is essentially back to baseline.  Her neurological examination is essentially intact.  The patient's MRIs are pending.  I would certainly continue all of her medicines and stroke risk factor modification.  I discussed all these issues in detail with the patient and answered all of her questions.  I will continue to follow the patient while she is in the hospital.

## 2024-07-31 NOTE — CARE PLAN
The patient's goals for the shift include      The clinical goals for the shift include Patient will remain safe throughout the shift      Problem: Pain - Adult  Goal: Verbalizes/displays adequate comfort level or baseline comfort level  Outcome: Progressing     Problem: Safety - Adult  Goal: Free from fall injury  Outcome: Progressing     Problem: Pain  Goal: Turns in bed with improved pain control throughout the shift  Outcome: Progressing     Problem: Pain  Goal: Participates in PT with improved pain control throughout the shift  Outcome: Progressing     Problem: Fall/Injury  Goal: Verbalize understanding of personal risk factors for fall in the hospital  Outcome: Met

## 2024-07-31 NOTE — PROGRESS NOTES
Social work consult placed for positive medical risk screen. SW reviewed pt's chart and communicated with TCC. No SW needs foreseen at this time. SW signing off; available upon request.    Junior Cano, MSW, LSW ([id number])   Care Transitions

## 2024-07-31 NOTE — PROGRESS NOTES
Occupational Therapy    OT Treatment    Patient Name: Amy Montague  MRN: [id number]  Today's Date: 7/31/2024  Time Calculation  Start Time: 1457  Stop Time: 1511  Time Calculation (min): 14 min        Assessment:  End of Session Communication:  (transport)  End of Session Patient Position: Up in room (Wheel chair with transport)     Plan:  Treatment Interventions: ADL retraining, Functional transfer training  OT Frequency: 4 times per week  OT Discharge Recommendations: Low intensity level of continued care  OT Recommended Transfer Status: Assist of 1 (CGA)  OT - OK to Discharge: Yes ((when medically approp.))  Treatment Interventions: ADL retraining, Functional transfer training    Subjective   Previous Visit Info:  OT Last Visit  OT Received On: 07/31/24  General:  General  Patient Position Received: Alarm off, not on at start of session, Bed, 3 rail up  General Comment: pt. is cooperative, cueing for safety  and EC. LAINEY recommended patient wear slip on shoe or socks with tread  vs. flip flops, pt. stumbled x2 on L leg. upon return to room instructed  pt. put on socks. pt. was SBA.Transfers CGA. Transport arrived taking patient to MRI       Pain:  Pain Assessment  Pain Assessment: 0-10  0-10 (Numeric) Pain Score:  (L hip did not rate)    Objective    Cognition:  Cognition  Overall Cognitive Status: Within Functional Limits  Coordination:     Activities of Daily Living: LE Dressing  LE Dressing: Yes  Sock Level of Assistance: Setup  Shoe Level of Assistance: Setup  LE Dressing Where Assessed: Edge of bed  LE Dressing Comments: educated patient on protective footwear or increased safety.  Functional Standing Tolerance:     Bed Mobility/Transfers: Bed Mobility  Bed Mobility: Yes  Bed Mobility 1  Bed Mobility 1: Supine to sitting  Level of Assistance 1: Distant supervision    Transfers  Transfer: Yes  Transfer 1  Transfer From 1: Sit to  Transfer to 1: Stand  Technique 1: Sit to stand, Stand to sit  Transfer Device  1: Walker  Transfer Level of Assistance 1: Contact guard      Functional Mobility:  Functional Mobility  Functional Mobility Performed: Yes  Functional Mobility 1  Surface 1:  (nursing unit)  Assistance 1: Contact guard, Close supervision  Quality of Functional Mobility 1: Inconsistent stride length  Comments 1: pt. walk nursing unit pt. did stumble x2 on LLE walking in flip flops recommended pt. wear slip on shoe or socks for improved safety. Pt. receptive once back in room              Outcome Measures:[facility] Daily Activity  Putting on and taking off regular lower body clothing: A little  Bathing (including washing, rinsing, drying): A little  Putting on and taking off regular upper body clothing: A little  Toileting, which includes using toilet, bedpan or urinal: A little  Taking care of personal grooming such as brushing teeth: A little  Eating Meals: None  Daily Activity - Total Score: 19        Education Documentation  Body Mechanics, taught by LAINEY Mcclure at 7/31/2024  3:27 PM.  Learner: Patient  Readiness: Acceptance  Method: Explanation  Response: Verbalizes Understanding, Needs Reinforcement    ADL Training, taught by LAINEY Mcclure at 7/31/2024  3:27 PM.  Learner: Patient  Readiness: Acceptance  Method: Explanation  Response: Verbalizes Understanding, Needs Reinforcement    Precautions, taught by LAINEY Mcclure at 7/31/2024  3:27 PM.  Learner: Patient  Readiness: Acceptance  Method: Explanation  Response: Verbalizes Understanding, Needs Reinforcement    Education Comments  No comments found.        OP EDUCATION:       Goals:  Encounter Problems       Encounter Problems (Active)       ADLs       Demo. safe, supervised ADLs.  (Progressing)       Start:  07/30/24    Expected End:  08/06/24               MOBILITY       Demo. safe, supervised func. mobility without symptoms of fall risk.  (Progressing)       Start:  07/30/24    Expected End:  08/06/24

## 2024-08-01 ENCOUNTER — PHARMACY VISIT (OUTPATIENT)
Dept: PHARMACY | Facility: CLINIC | Age: 69
End: 2024-08-01
Payer: MEDICARE

## 2024-08-01 VITALS
OXYGEN SATURATION: 91 % | HEART RATE: 58 BPM | TEMPERATURE: 96.7 F | BODY MASS INDEX: 24.89 KG/M2 | HEIGHT: 61 IN | WEIGHT: 131.84 LBS | SYSTOLIC BLOOD PRESSURE: 94 MMHG | DIASTOLIC BLOOD PRESSURE: 58 MMHG | RESPIRATION RATE: 18 BRPM

## 2024-08-01 PROBLEM — E87.6 HYPOKALEMIA: Status: RESOLVED | Noted: 2024-07-29 | Resolved: 2024-08-01

## 2024-08-01 PROBLEM — M25.552 LEFT HIP PAIN: Status: RESOLVED | Noted: 2024-07-30 | Resolved: 2024-08-01

## 2024-08-01 PROBLEM — R55 SYNCOPE AND COLLAPSE: Status: RESOLVED | Noted: 2024-07-29 | Resolved: 2024-08-01

## 2024-08-01 LAB
ANION GAP SERPL CALC-SCNC: 9 MMOL/L (ref 10–20)
BUN SERPL-MCNC: 22 MG/DL (ref 6–23)
CALCIUM SERPL-MCNC: 9.2 MG/DL (ref 8.6–10.3)
CHLORIDE SERPL-SCNC: 98 MMOL/L (ref 98–107)
CO2 SERPL-SCNC: 36 MMOL/L (ref 21–32)
CREAT SERPL-MCNC: 0.87 MG/DL (ref 0.5–1.05)
EGFRCR SERPLBLD CKD-EPI 2021: 73 ML/MIN/1.73M*2
ERYTHROCYTE [DISTWIDTH] IN BLOOD BY AUTOMATED COUNT: 12.5 % (ref 11.5–14.5)
GLUCOSE SERPL-MCNC: 79 MG/DL (ref 74–99)
HCT VFR BLD AUTO: 38.4 % (ref 36–46)
HGB BLD-MCNC: 12.3 G/DL (ref 12–16)
MCH RBC QN AUTO: 30.2 PG (ref 26–34)
MCHC RBC AUTO-ENTMCNC: 32 G/DL (ref 32–36)
MCV RBC AUTO: 94 FL (ref 80–100)
NRBC BLD-RTO: 0 /100 WBCS (ref 0–0)
PLATELET # BLD AUTO: 127 X10*3/UL (ref 150–450)
POTASSIUM SERPL-SCNC: 4.1 MMOL/L (ref 3.5–5.3)
RBC # BLD AUTO: 4.07 X10*6/UL (ref 4–5.2)
SODIUM SERPL-SCNC: 139 MMOL/L (ref 136–145)
WBC # BLD AUTO: 6.3 X10*3/UL (ref 4.4–11.3)

## 2024-08-01 PROCEDURE — 97116 GAIT TRAINING THERAPY: CPT | Mod: GP,CQ

## 2024-08-01 PROCEDURE — G0378 HOSPITAL OBSERVATION PER HR: HCPCS

## 2024-08-01 PROCEDURE — 36415 COLL VENOUS BLD VENIPUNCTURE: CPT | Performed by: INTERNAL MEDICINE

## 2024-08-01 PROCEDURE — 2500000004 HC RX 250 GENERAL PHARMACY W/ HCPCS (ALT 636 FOR OP/ED): Performed by: STUDENT IN AN ORGANIZED HEALTH CARE EDUCATION/TRAINING PROGRAM

## 2024-08-01 PROCEDURE — 80048 BASIC METABOLIC PNL TOTAL CA: CPT | Performed by: INTERNAL MEDICINE

## 2024-08-01 PROCEDURE — RXMED WILLOW AMBULATORY MEDICATION CHARGE

## 2024-08-01 PROCEDURE — 99239 HOSP IP/OBS DSCHRG MGMT >30: CPT | Performed by: INTERNAL MEDICINE

## 2024-08-01 PROCEDURE — 2500000001 HC RX 250 WO HCPCS SELF ADMINISTERED DRUGS (ALT 637 FOR MEDICARE OP): Performed by: STUDENT IN AN ORGANIZED HEALTH CARE EDUCATION/TRAINING PROGRAM

## 2024-08-01 PROCEDURE — 97530 THERAPEUTIC ACTIVITIES: CPT | Mod: GP,CQ

## 2024-08-01 PROCEDURE — 85027 COMPLETE CBC AUTOMATED: CPT | Performed by: INTERNAL MEDICINE

## 2024-08-01 RX ORDER — LISINOPRIL 10 MG/1
10 TABLET ORAL DAILY
Qty: 30 TABLET | Refills: 0 | Status: SHIPPED | OUTPATIENT
Start: 2024-08-01 | End: 2024-08-31

## 2024-08-01 RX ORDER — HYDROCHLOROTHIAZIDE 25 MG/1
25 TABLET ORAL DAILY
Qty: 30 TABLET | Refills: 0 | Status: SHIPPED | OUTPATIENT
Start: 2024-08-01 | End: 2024-08-31

## 2024-08-01 RX ORDER — METOPROLOL TARTRATE 25 MG/1
25 TABLET, FILM COATED ORAL 2 TIMES DAILY
Qty: 60 TABLET | Refills: 0 | Status: SHIPPED | OUTPATIENT
Start: 2024-08-01 | End: 2024-08-31

## 2024-08-01 RX ADMIN — ASPIRIN 81 MG: 81 TABLET, COATED ORAL at 08:15

## 2024-08-01 RX ADMIN — FLUTICASONE FUROATE AND VILANTEROL TRIFENATATE 1 PUFF: 200; 25 POWDER RESPIRATORY (INHALATION) at 08:15

## 2024-08-01 RX ADMIN — TIOTROPIUM BROMIDE INHALATION SPRAY 2 PUFF: 3.12 SPRAY, METERED RESPIRATORY (INHALATION) at 08:15

## 2024-08-01 RX ADMIN — LISINOPRIL 40 MG: 20 TABLET ORAL at 08:15

## 2024-08-01 RX ADMIN — FLUOXETINE HYDROCHLORIDE 40 MG: 20 CAPSULE ORAL at 08:15

## 2024-08-01 RX ADMIN — CETIRIZINE HYDROCHLORIDE 10 MG: 10 TABLET, FILM COATED ORAL at 08:15

## 2024-08-01 RX ADMIN — ENOXAPARIN SODIUM 40 MG: 40 INJECTION SUBCUTANEOUS at 08:15

## 2024-08-01 RX ADMIN — METOPROLOL TARTRATE 25 MG: 25 TABLET, FILM COATED ORAL at 08:15

## 2024-08-01 RX ADMIN — HYDROCHLOROTHIAZIDE 25 MG: 25 TABLET ORAL at 08:15

## 2024-08-01 RX ADMIN — CLOPIDOGREL 75 MG: 75 TABLET ORAL at 08:15

## 2024-08-01 RX ADMIN — BUSPIRONE HYDROCHLORIDE 10 MG: 5 TABLET ORAL at 08:15

## 2024-08-01 ASSESSMENT — COGNITIVE AND FUNCTIONAL STATUS - GENERAL
CLIMB 3 TO 5 STEPS WITH RAILING: A LITTLE
STANDING UP FROM CHAIR USING ARMS: A LITTLE
DAILY ACTIVITIY SCORE: 24
WALKING IN HOSPITAL ROOM: A LITTLE
MOBILITY SCORE: 20
STANDING UP FROM CHAIR USING ARMS: A LITTLE
WALKING IN HOSPITAL ROOM: A LITTLE
MOVING TO AND FROM BED TO CHAIR: A LITTLE
CLIMB 3 TO 5 STEPS WITH RAILING: A LITTLE
MOVING TO AND FROM BED TO CHAIR: A LITTLE
MOBILITY SCORE: 20

## 2024-08-01 ASSESSMENT — PAIN SCALES - GENERAL
PAINLEVEL_OUTOF10: 0 - NO PAIN
PAINLEVEL_OUTOF10: 0 - NO PAIN

## 2024-08-01 NOTE — PROGRESS NOTES
08/01/24 1040   Discharge Planning   Home or Post Acute Services None   Expected Discharge Disposition Home     PT recommendation is for LOW intensity therapy at discharge. TCC spoke with patient who stated she felt she did better with PT today once she was not wearing flip flops, as she was during the initial eval. Patient's AMPAC improved to 20 today. Patient is denying need for PT at discharge and plan is for discharge home.

## 2024-08-01 NOTE — DISCHARGE SUMMARY
Discharge Diagnosis  Syncope and collapse  Intracranial aneurysm  Anxiety and depression    FADIA (generalized anxiety disorder)    CAD (coronary atherosclerotic disease)    Chronic respiratory failure with hypoxia, on home O2 therapy (Multi)    Hyperlipidemia    Essential hypertension    COPD, moderate (Multi)    Hypokalemia    Left hip pain  Issues Requiring Follow-Up      Discharge Meds     Your medication list        CHANGE how you take these medications        Instructions Last Dose Given Next Dose Due   hydroCHLOROthiazide 25 mg tablet  Commonly known as: HYDRODiuril  What changed: additional instructions      Take 1 tablet (25 mg) by mouth once daily. HOLD for systolic blood pressure less than 120 mmHg       lisinopril 10 mg tablet  What changed:   medication strength  how much to take  additional instructions      Take 1 tablet (10 mg) by mouth once daily. HOLD for systolic blood pressure less than 110 mmHg       metoprolol tartrate 25 mg tablet  Commonly known as: Lopressor  What changed:   how much to take  when to take this  additional instructions      Take 1 tablet (25 mg) by mouth 2 times a day. HOLD for systolic blood pressure less than 110 mmHg and or heart rate less than 55/min              CONTINUE taking these medications        Instructions Last Dose Given Next Dose Due   albuterol 0.63 mg/3 mL nebulizer solution           albuterol 90 mcg/actuation inhaler  Commonly known as: ProAir HFA      Inhale 2 puffs every 4 hours if needed for wheezing or shortness of breath.       aspirin 81 mg EC tablet           clonazePAM 1 mg tablet  Commonly known as: KlonoPIN           clopidogrel 75 mg tablet  Commonly known as: Plavix           FLUoxetine 40 mg capsule  Commonly known as: PROzac           fluticasone 50 mcg/actuation nasal spray  Commonly known as: Flonase           omeprazole 40 mg DR capsule  Commonly known as: PriLOSEC           oxygen gas therapy  Commonly known as: O2           rosuvastatin 20  mg tablet  Commonly known as: Crestor           Spiriva with HandiHaler 18 mcg inhalation capsule  Generic drug: tiotropium           traZODone 50 mg tablet  Commonly known as: Desyrel           Tylenol Extra Strength 500 mg tablet  Generic drug: acetaminophen           ZyrTEC 10 mg tablet  Generic drug: cetirizine                  ASK your doctor about these medications        Instructions Last Dose Given Next Dose Due   fluticasone propion-salmeteroL 250-50 mcg/dose diskus inhaler  Commonly known as: Advair Diskus      Inhale 1 puff 2 times a day. Rinse mouth with water after use to reduce aftertaste and incidence of candidiasis. Do not swallow.                 Where to Get Your Medications        These medications were sent to [facility]  [address] [address]      Hours: 8AM to 6PM Mon-Fri, 8AM to 4PM Sat-Sun Phone: [phone]   hydroCHLOROthiazide 25 mg tablet  lisinopril 10 mg tablet  metoprolol tartrate 25 mg tablet         Test Results Pending At Discharge  Pending Labs       No current pending labs.            Hospital Course  Amy Montague is a 69 y.o. female with PMHx s/f CAD w stents on DAPT, COPD on 2 L chronically, PRES, HLD, FADIA, HTN, anxiety/depression presenting with fall and syncope.Pt says two days ago she might have tripped over her O2 tank while going to the bathroom in the middle of the night.She woke up on the floor after and indeterminate amount of time not remember what happened. Denies head trauma. Since then she has been having L hip and back pain. No other recent falls or lightheadedness or syncope. No nausea, vomiting, worsening shortness of breath, chest pain, abdominal pain, or leg swelling. She is having issues walking. No urinary or bowel problems, weakness in legs, or other symptoms. She says she has been taking all of her medications as prescribed. No stroke like symptoms or focal neurological deficit. States she still smokes tobacco  "\"occasionally.\"     ED Course (Summary):   Vitals on presentation: 98.2 F, 60 bpm, 19 rr, 124/68, 89% on ? --> 95% on 2.5 L NC  Labs: CMP glu 118, K 2.9  Mag 1.87  Lipase 32  Trop 3  CBC unremarkable  Imaging: XR hip - 1.  No acute osseous findings involving the left hip.   2.  Mild degenerative changes.   CXR - No acute process.   CT cervical - NO ACUTE FRACTURE OR TRAUMATIC SUBLUXATION.   CT head - No acute intracranial hemorrhage, mass effect, or CT apparent acute   infarct.   CT a/p w contrast - No evidence for renal or ureteral calculi.  No evidence of   hydronephrosis.   Diverticulosis without diverticulitis.   No acute process..   Interventions: morphine 4 mg X1, Zofran 4 mg X1, Klor con 30 meq X1     7/30: Patient was seen and examined.  She was awake alert and interactive.  Echocardiogram is still pending.  Orthostatic blood pressure done was positive.  Will continue IV fluids.  Will repeat orthostatic and consider LR bolus if persistent.  She denies any history of seizures however in the setting of PRES I will order EEG.  Neurologist consulted.  7/31:Patient was seen and examined.  Has no new complaints today.  Still awake alert and interactive.  Echocardiogram does show an ejection fraction of 58% with normal right ventricular global systolic function and no significant valvular abnormalities.  EEG done and pending.  Seen by neurologist and MRI of the brain ordered.  8/1: Patient was seen and examined.  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  Patient was seen by neurologist who recommended referral to neurosurgery.  Referral sent and discussed with patient.  Seen by PT OT who recommended home with home health care and home physical therapy.  Her blood pressure tends to drop after antihypertensives given.  I have cut back lisinopril from 40 mg to 10 mg and " recommended checking blood pressures every morning before taking her medications.  She was discharged in stable condition to follow-up as outpatient with primary care physician within 1 week of discharge and with neurosurgery as scheduled.      The discharge process took about 40 minutes  Pertinent Physical Exam At Time of Discharge  Physical Exam  Constitutional: Pleasant and cooperative. Laying in bed in no acute distress. Conversant.   Skin: Warm and dry; no obvious lesions, rashes, pallor, or jaundice. Good turgor.   Eyes: EOMI. Anicteric sclera.   ENT: Mucous membranes moist; no obvious injury or deformity appreciated.   Head and Neck: Normocephalic, atraumatic. ROM preserved. Trachea midline. No appreciable JVD.   Respiratory: Nonlabored on 2.5 L NC. Lungs clear to auscultation bilaterally without obvious adventitious sounds. Chest rise is equal.  Cardiovascular: RRR. No gross murmur, gallop, or rub. Extremities are warm and well-perfused with good capillary refill (< 3 seconds). No chest wall tenderness.   GI: Abdomen soft, nontender, nondistended. No obvious organomegaly appreciated. Bowel sounds are present and normoactive.  : No CVA tenderness.   MSK: No gross abnormalities appreciated. No limitations to AROM/PROM appreciated.   Extremities: No cyanosis, edema, or clubbing evident. Neurovascularly intact.   Neuro: A&Ox3. CN 2-12 grossly intact. Able to respond to questions appropriately and clearly. No acute focal neurologic deficits appreciated.  Psych: Appropriate mood and behavior.  Outpatient Follow-Up  No future appointments.      Stacie Greco MD

## 2024-08-01 NOTE — PROGRESS NOTES
Physical Therapy    Physical Therapy Treatment    Patient Name: Amy Montague  MRN: [id number]  Today's Date: 8/1/2024  Time Calculation  Start Time: 0905  Stop Time: 0930  Time Calculation (min): 25 min    Assessment/Plan   PT Assessment  PT Assessment Results: Decreased strength  Rehab Prognosis: Good  Evaluation/Treatment Tolerance: Patient tolerated treatment well  End of Session Patient Position: Up in chair     PT Plan  Treatment/Interventions: Bed mobility, Transfer training, Gait training, Stair training, Balance training, Strengthening, Endurance training, Therapeutic exercise, Therapeutic activity, Home exercise program  PT Plan: Ongoing PT  PT Frequency: 4 times per week  PT Discharge Recommendations: Low intensity level of continued care  Equipment Recommended upon Discharge:  (TBD)  PT - OK to Discharge: Yes (when medically cleared)      General Visit Information:   PT  Visit  PT Received On: 08/01/24       Objective   Pain:  Pain Assessment  0-10 (Numeric) Pain Score: 0 - No pain  Cognition:  Cognition  Overall Cognitive Status: Within Functional Limits    Treatments:  pt on 4L 02. pt up in chair upon arrival. pt amb with close supervision 400ft, during amb pt had 1x standing rest break in hallway for about 30 seconds. pt reports no SOB with activity. no LOB during amb. pt remained up in chair.     Outcome Measures:  [facility] Basic Mobility  Turning from your back to your side while in a flat bed without using bedrails: None  Moving from lying on your back to sitting on the side of a flat bed without using bedrails: None  Moving to and from bed to chair (including a wheelchair): A little  Standing up from a chair using your arms (e.g. wheelchair or bedside chair): A little  To walk in hospital room: A little  Climbing 3-5 steps with railing: A little  Basic Mobility - Total Score: 20    Education Documentation  Mobility Training, taught by Mady Madrid PTA at 8/1/2024  9:43 AM.  Learner:  Patient  Readiness: Acceptance  Method: Explanation  Response: Verbalizes Understanding    Education Comments  No comments found.        OP EDUCATION:       Encounter Problems       Encounter Problems (Active)       PT Problem       transfers (Progressing)       Start:  07/30/24    Expected End:  08/13/24       Patient will perform all transfers with SBA x1, good safety awareness           gait (Progressing)       Start:  07/30/24    Expected End:  08/13/24       Patient will amb 100+ feet with SBA x1, no LOB, FWW vs cane prn          strengthening  (Progressing)       Start:  07/30/24    Expected End:  08/13/24       Patient will perform 20+ reps of AROM/RROM for URMILA LE's to improve safety and functional independence              Pain - Adult

## 2024-08-01 NOTE — CARE PLAN
The patient's goals for the shift include      The clinical goals for the shift include Patient will remain safe throughout the shift      Problem: Pain - Adult  Goal: Verbalizes/displays adequate comfort level or baseline comfort level  Outcome: Progressing     Problem: Discharge Planning  Goal: Discharge to home or other facility with appropriate resources  Outcome: Progressing     Problem: Chronic Conditions and Co-morbidities  Goal: Patient's chronic conditions and co-morbidity symptoms are monitored and maintained or improved  Outcome: Progressing     Problem: Safety - Adult  Goal: Free from fall injury  Outcome: Met     Problem: Fall/Injury  Goal: Use assistive devices by end of the shift  Outcome: Met

## 2024-08-02 ENCOUNTER — PATIENT OUTREACH (OUTPATIENT)
Dept: CARE COORDINATION | Facility: CLINIC | Age: 69
End: 2024-08-02
Payer: MEDICARE

## 2024-08-15 ENCOUNTER — PATIENT OUTREACH (OUTPATIENT)
Dept: CARE COORDINATION | Facility: CLINIC | Age: 69
End: 2024-08-15
Payer: MEDICARE

## 2024-08-15 NOTE — PROGRESS NOTES
Outreach call to patient following up on appointment with primary care provider. Left voicemail message .Will continue to follow.

## 2024-08-29 ENCOUNTER — ANCILLARY PROCEDURE (OUTPATIENT)
Dept: CARDIOLOGY | Facility: HOSPITAL | Age: 69
End: 2024-08-29
Payer: MEDICARE

## 2024-08-29 DIAGNOSIS — R55 SYNCOPE AND COLLAPSE: ICD-10-CM

## 2024-08-29 PROBLEM — E66.3 OVERWEIGHT: Status: ACTIVE | Noted: 2024-08-29

## 2024-08-29 PROBLEM — F17.210 CIGARETTE SMOKER: Status: ACTIVE | Noted: 2024-08-29

## 2024-08-29 PROBLEM — F17.200 SMOKING: Status: ACTIVE | Noted: 2024-08-29

## 2024-08-29 PROBLEM — R09.02 HYPOXIA: Status: ACTIVE | Noted: 2024-08-29

## 2024-08-29 PROBLEM — Z95.5 PRESENCE OF CORONARY ANGIOPLASTY IMPLANT AND GRAFT: Status: ACTIVE | Noted: 2024-08-29

## 2024-08-29 PROBLEM — E78.2 MIXED HYPERLIPIDEMIA: Status: ACTIVE | Noted: 2024-08-29

## 2024-08-29 PROBLEM — R73.03 PREDIABETES: Status: ACTIVE | Noted: 2024-08-29

## 2024-08-29 PROBLEM — I67.1 CEREBRAL ANEURYSM, NONRUPTURED (HHS-HCC): Status: ACTIVE | Noted: 2024-08-29

## 2024-08-29 PROBLEM — N28.9 RENAL INSUFFICIENCY: Status: ACTIVE | Noted: 2024-08-29

## 2024-08-29 PROBLEM — G47.00 INSOMNIA: Status: ACTIVE | Noted: 2024-08-29

## 2024-08-29 PROCEDURE — 93246 EXT ECG>7D<15D RECORDING: CPT

## 2024-08-29 PROCEDURE — 93248 EXT ECG>7D<15D REV&INTERPJ: CPT | Performed by: INTERNAL MEDICINE

## 2024-08-29 RX ORDER — FEXOFENADINE HCL 60 MG/1
60 TABLET, FILM COATED ORAL
COMMUNITY

## 2024-08-29 RX ORDER — LOVASTATIN 40 MG/1
40 TABLET ORAL NIGHTLY
COMMUNITY

## 2024-08-29 RX ORDER — MICONAZOLE NITRATE 2 %
CREAM (GRAM) TOPICAL
COMMUNITY
Start: 2024-08-13 | End: 2025-02-08

## 2024-08-30 NOTE — PROGRESS NOTES
NPV - Referred by Dr. Greco and Dr. 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 MRA done 7/31/24.    Amy Montague is a 69 y.o. year old female    HPI  Ms. Montague is a 61-year-old lady recently had a fall after tripping over a rug per patient with loss of consciousness.  Further workup showed a 3 mm left SCA aneurysm.  She is referred for further evaluation.    Positive smoking  No family history of brain aneurysm  Hypertension but controlled per patient    Review of Systems       Past Medical History:   Diagnosis Date    Anxiety 09/19/2012    Mixed anxiety depressive disorder 02/23/2022    Old myocardial infarction 11/19/2020    H/O non-ST elevation myocardial infarction (NSTEMI)    Opioid dependence, uncomplicated (Multi) 11/05/2020    Opioid dependence    Personal history of other diseases of the digestive system     History of ischemic colitis    Personal history of other infectious and parasitic diseases     H/O Clostridium difficile infection    Posterior reversible encephalopathy syndrome 12/11/2021    Primary hypertension 12/11/2021       Past Surgical History:   Procedure Laterality Date    OTHER SURGICAL HISTORY  11/11/2019    Retinal detachment repair    OTHER SURGICAL HISTORY  11/11/2019    Carpal tunnel surgery    OTHER SURGICAL HISTORY  11/05/2020    Tubal ligation    OTHER SURGICAL HISTORY  02/23/2022    Eye surgery    OTHER SURGICAL HISTORY  11/05/2020    Coronary artery stent placement           Current Outpatient Medications:     acetaminophen (Tylenol Extra Strength) 500 mg tablet, Take 1 tablet (500 mg) by mouth. PRN, Disp: , Rfl:     albuterol (ProAir HFA) 90 mcg/actuation inhaler, Inhale 2 puffs every 4 hours if needed for wheezing or shortness of breath., Disp: 8.5 g, Rfl: 0    aspirin 81 mg EC tablet, Take 1 tablet (81 mg) by mouth once daily., Disp: , Rfl:      cetirizine (ZyrTEC) 10 mg tablet, Take 1 tablet (10 mg) by mouth once daily., Disp: , Rfl:     clonazePAM (KlonoPIN) 1 mg tablet, Take 1 tablet (1 mg) by mouth 2 times a day as needed., Disp: , Rfl:     clopidogrel (Plavix) 75 mg tablet, Take 1 tablet (75 mg) by mouth once daily., Disp: , Rfl:     fexofenadine (Allegra) 60 mg tablet, Take 1 tablet (60 mg) by mouth., Disp: , Rfl:     FLUoxetine (PROzac) 40 mg capsule, TAKE 1 CAPSULE BY MOUTH EVERY DAY FOR 30 DAYS, Disp: , Rfl:     fluticasone (Flonase) 50 mcg/actuation nasal spray, Administer 1 spray into each nostril once daily. Shake gently. Before first use, prime pump. After use, clean tip and replace cap., Disp: , Rfl:     fluticasone propion-salmeteroL (Advair Diskus) 250-50 mcg/dose diskus inhaler, Inhale 1 puff 2 times a day. Rinse mouth with water after use to reduce aftertaste and incidence of candidiasis. Do not swallow., Disp: 1 each, Rfl: 0    L. acidophilus/Bifid. animalis 32 billion cell capsule, Take by mouth., Disp: , Rfl:     omeprazole (PriLOSEC) 40 mg DR capsule, TAKE 1 CAPSULE BY MOUTH EVERY DAY 30 MINUTES BEFORE MORNING MEAL, Disp: , Rfl:     oxygen (O2) gas therapy, Oxygen  Refills: 0      Start : 5-Nov-2020  Active, Disp: , Rfl:     rosuvastatin (Crestor) 20 mg tablet, Take 1 tablet (20 mg) by mouth once daily., Disp: , Rfl:     Spiriva with HandiHaler 18 mcg inhalation capsule, 1 capsule (18 mcg) once daily at bedtime., Disp: , Rfl:     traZODone (Desyrel) 50 mg tablet, Take 1 tablet (50 mg) by mouth as needed at bedtime., Disp: , Rfl:     albuterol 0.63 mg/3 mL nebulizer solution, Take 3 mL (0.63 mg) by nebulization every 4 hours if needed for wheezing., Disp: , Rfl:     hydroCHLOROthiazide (HYDRODiuril) 25 mg tablet, Take 1 tablet (25 mg) by mouth once daily. HOLD for systolic blood pressure less than 120 mmHg (Patient taking differently: Take by mouth once daily. HOLD for systolic blood pressure less than 120 mmHg), Disp: 30 tablet, Rfl:  0    lisinopril 10 mg tablet, Take 1 tablet (10 mg) by mouth once daily. HOLD for systolic blood pressure less than 110 mmHg (Patient taking differently: Take 2 tablets (20 mg) by mouth once daily. HOLD for systolic blood pressure less than 110 mmHg), Disp: 30 tablet, Rfl: 0    lovastatin (Mevacor) 40 mg tablet, Take 1 tablet (40 mg) by mouth once daily at bedtime., Disp: , Rfl:     metoprolol tartrate (Lopressor) 25 mg tablet, Take 1 tablet (25 mg) by mouth 2 times a day. HOLD for systolic blood pressure less than 110 mmHg and or heart rate less than 55/min, Disp: 60 tablet, Rfl: 0    nicotine polacrilex (Nicorette) 2 mg gum, 1 piece chew for 30 minutes Mouth/Throat every 2 hours for 90 days, Disp: , Rfl:         Objective   Vitals:    09/03/24 1108   BP: 141/89   Pulse: 81   Temp: 36 °C (96.8 °F)       Neurological Exam    AAO x 3  PERRL, EOMI, TS, TML  5/5  Senorsy intact  No drift    [unfilled]  MR brain wo IV contrast  MR angio neck wo IV contrast  MR angio head wo IV contrast  Status: Final result     PACS Images     Show images for MR angio head wo IV contrast  Signed by    Signed Time Phone Pager   Darrin Thakkar MD 7/31/2024 16:35 [phone]     Exam Information    Status Exam Begun Exam Ended   Final 7/31/2024 15:17 7/31/2024 16:09     Study Result    Narrative & Impression   Interpreted By:  Darrin Thakkar,  and Marc Friend   STUDY:  MR BRAIN WO IV CONTRAST; MR ANGIO NECK WO IV CONTRAST; MR ANGIO HEAD  WO IV CONTRAST;  7/31/2024 4:09 pm      INDICATION:  Signs/Symptoms:LOC - syncope vs seizure with hx of PRES.  Stroke  protocol.      COMPARISON:  MRI brain 12/09/2021. CT head 07/29/2024.      ACCESSION NUMBER(S):  [id number]; [id number]; [id number]      ORDERING CLINICIAN:  MUMTAZ DYSON      TECHNIQUE:  Axial T2, FLAIR, DWI, gradient echo T2 and  sagittal and coronal T1  weighted images of brain were acquired. Axial T2 images are degraded  due to motion artifact.      Time-of-flight  MRA of the head  and neck was performed. The images  were reviewed as source images and maximum intensity projections.      FINDINGS:  Brain:      CSF Spaces: The ventricles, sulci and basal cisterns are within  normal limits.      Parenchyma: There is no diffusion restriction abnormality to suggest  acute infarct. Punctate periventricular and subcortical white matter  hyperintensities are similar to prior. There is no mass effect or  midline shift. There is no intracranial hemorrhage.      Paranasal Sinuses and Mastoids: Mild scattered ethmoid mucosal  thickening and partial opacification of the left sphenoid sinus. Mild  nonspecific fluid within the right mastoid. The left mastoid is clear.          MRA of head:      Anterior circulation:    There is expected flow signal in bilateral  intracranial internal carotid arteries, bilateral carotid terminals,  bilateral proximal anterior and middle cerebral arteries.      Posterior circulation:  Fetal origin of the right PCA with a  diminutive P1, normal variant. Bilateral intracranial vertebral  arteries, vertebrobasilar junction, basilar artery and proximal  posterior cerebral arteries demonstrate expected flow signal. There  is a 3 x 3 mm saccular aneurysms at the origin of the left superior  cerebellar artery. The left superior cerebellar artery appears  diminutive in size compared to the right.      MRA of neck:      The source images are mildly degraded by motion artifact.      Right carotid vessels:  There is expected flow signal in the  visualized portion of the common carotid artery.  There is mild  attenuation of flow signal at the carotid bifurcation which may be  secondary to flow related artifact. The internal carotid artery in  the neck demonstrates expected flow signal.      Left carotid vessels:   There is expected flow signal in the  visualized portion of the common carotid artery.  There is mild  attenuation of flow signal at the carotid bifurcation  which may be  secondary to flow related artifact. The internal carotid artery in  the neck demonstrates expected flow signal.      Vertebral vessels:   The visualized segments of the cervical  vertebral arteries demonstrate expected flow signal. Parenchyma:  There is no diffusion restriction abnormality to suggest acute  infarct.  Punctate periventricular and subcortical white matter  hyperintensities are similar to prior. There is no mass effect or  midline shift. There is no intracranial hemorrhage.      Paranasal Sinuses and Mastoids: Mild scattered ethmoid mucosal  thickening and partial opacification of the left sphenoid sinus. Mild  nonspecific fluid within the right mastoid. The left mastoid is clear.      IMPRESSION:  MRI Brain:  *There is no evidence of acute hemorrhage, mass lesion or acute  infarction. *Similar appearing punctate periventricular and  subcortical white matter hyperintensities, nonspecific, however  likely relate to sequela of chronic microvascular ischemic disease  given the patient's age.          MRA:      *No evidence of major vessel cutoff or significant stenosis on MRA  head and neck. *There is a 3 x 3 mm saccular aneurysms at the origin  of the left superior cerebellar artery. The left superior cerebellar  artery is diminutive in size compared to the contralateral right.          I personally reviewed the images/study and I agree with the findings  as stated by Resident Phuc Tran. This study was interpreted at  [facility], [place], [place].      MACRO:  Critical Finding:  See findings. Notification was initiated on  7/31/2024 at 4:35 pm by  Darrin Thakkar.  (**-OC[facility]-**)      Signed by: Darrin Thakkar 7/31/2024 4:35 PM  Dictation workstation:   [id number]         Assessment/Plan     I had the pleasure of seeing Ms. Montague and her daughter and had a thorough discussion as well as review her MRI together.  She has an incidental 3 mm left SCA  aneurysm  .  We discussed the natural history of cerebral aneurysm.  Based on all risks factors, the annual risk of cerebral aneurysm rupture is approximately < 0.5%.  We discussed the treatment options, which included observation with serial imaging, microsurgery, and endovascular treatment.  We also discussed the statistics associated with a rupture aneurysm.  The patient understands the signs and symptoms of a ruptured aneurysm.  In the event of a ruptured aneurysm, patient and their family understand they need to go to the nearest ER (airway protection).      Based on factors, I would recommend observation with repeat MRA in a year.  I also strongly recommended that she stop smoking as this increases the risk of aneurysm rupture.  She promised she will work on it.  All questions were answered to her satisfaction.

## 2024-09-03 ENCOUNTER — OFFICE VISIT (OUTPATIENT)
Dept: NEUROSURGERY | Facility: CLINIC | Age: 69
End: 2024-09-03
Payer: MEDICARE

## 2024-09-03 VITALS
WEIGHT: 131 LBS | TEMPERATURE: 96.8 F | DIASTOLIC BLOOD PRESSURE: 89 MMHG | HEART RATE: 81 BPM | BODY MASS INDEX: 24.73 KG/M2 | SYSTOLIC BLOOD PRESSURE: 141 MMHG | HEIGHT: 61 IN

## 2024-09-03 DIAGNOSIS — I67.1 INTRACRANIAL ANEURYSM (HHS-HCC): ICD-10-CM

## 2024-09-03 PROCEDURE — 3077F SYST BP >= 140 MM HG: CPT | Performed by: NEUROLOGICAL SURGERY

## 2024-09-03 PROCEDURE — 99202 OFFICE O/P NEW SF 15 MIN: CPT | Performed by: NEUROLOGICAL SURGERY

## 2024-09-03 PROCEDURE — 1125F AMNT PAIN NOTED PAIN PRSNT: CPT | Performed by: NEUROLOGICAL SURGERY

## 2024-09-03 PROCEDURE — 99212 OFFICE O/P EST SF 10 MIN: CPT | Performed by: NEUROLOGICAL SURGERY

## 2024-09-03 PROCEDURE — 3079F DIAST BP 80-89 MM HG: CPT | Performed by: NEUROLOGICAL SURGERY

## 2024-09-03 PROCEDURE — 3008F BODY MASS INDEX DOCD: CPT | Performed by: NEUROLOGICAL SURGERY

## 2024-09-03 PROCEDURE — 1159F MED LIST DOCD IN RCRD: CPT | Performed by: NEUROLOGICAL SURGERY

## 2024-09-03 ASSESSMENT — PAIN SCALES - GENERAL: PAINLEVEL: 8

## 2024-09-04 ENCOUNTER — PATIENT OUTREACH (OUTPATIENT)
Dept: CARE COORDINATION | Facility: CLINIC | Age: 69
End: 2024-09-04
Payer: MEDICARE

## 2024-09-04 NOTE — PROGRESS NOTES
Outreach call to patient to check in 30 days after hospital discharge to support smooth transition of care.  Left voicemail message.  No additional outreach needed at this time.

## 2024-10-31 ENCOUNTER — HOSPITAL ENCOUNTER (OUTPATIENT)
Dept: RADIOLOGY | Facility: CLINIC | Age: 69
Discharge: HOME | End: 2024-10-31
Payer: MEDICARE

## 2024-10-31 DIAGNOSIS — F17.210 NICOTINE DEPENDENCE, CIGARETTES, UNCOMPLICATED: ICD-10-CM

## 2024-10-31 PROCEDURE — 71271 CT THORAX LUNG CANCER SCR C-: CPT

## 2024-11-17 ENCOUNTER — HOSPITAL ENCOUNTER (EMERGENCY)
Facility: HOSPITAL | Age: 69
Discharge: HOME | End: 2024-11-17
Attending: EMERGENCY MEDICINE
Payer: MEDICARE

## 2024-11-17 ENCOUNTER — APPOINTMENT (OUTPATIENT)
Dept: RADIOLOGY | Facility: HOSPITAL | Age: 69
End: 2024-11-17
Payer: MEDICARE

## 2024-11-17 ENCOUNTER — APPOINTMENT (OUTPATIENT)
Dept: CARDIOLOGY | Facility: HOSPITAL | Age: 69
End: 2024-11-17
Payer: MEDICARE

## 2024-11-17 VITALS
OXYGEN SATURATION: 95 % | SYSTOLIC BLOOD PRESSURE: 139 MMHG | WEIGHT: 148 LBS | DIASTOLIC BLOOD PRESSURE: 52 MMHG | HEART RATE: 68 BPM | BODY MASS INDEX: 27.94 KG/M2 | HEIGHT: 61 IN | RESPIRATION RATE: 18 BRPM | TEMPERATURE: 99.3 F

## 2024-11-17 DIAGNOSIS — J44.1 COPD EXACERBATION (MULTI): Primary | ICD-10-CM

## 2024-11-17 LAB
ALBUMIN SERPL BCP-MCNC: 4 G/DL (ref 3.4–5)
ALP SERPL-CCNC: 64 U/L (ref 33–136)
ALT SERPL W P-5'-P-CCNC: 14 U/L (ref 7–45)
ANION GAP SERPL CALC-SCNC: 12 MMOL/L (ref 10–20)
AST SERPL W P-5'-P-CCNC: 13 U/L (ref 9–39)
BASOPHILS # BLD AUTO: 0.04 X10*3/UL (ref 0–0.1)
BASOPHILS NFR BLD AUTO: 0.3 %
BILIRUB SERPL-MCNC: 0.4 MG/DL (ref 0–1.2)
BNP SERPL-MCNC: 88 PG/ML (ref 0–99)
BUN SERPL-MCNC: 21 MG/DL (ref 6–23)
CALCIUM SERPL-MCNC: 9.3 MG/DL (ref 8.6–10.3)
CARDIAC TROPONIN I PNL SERPL HS: 3 NG/L (ref 0–13)
CHLORIDE SERPL-SCNC: 96 MMOL/L (ref 98–107)
CO2 SERPL-SCNC: 33 MMOL/L (ref 21–32)
CREAT SERPL-MCNC: 0.8 MG/DL (ref 0.5–1.05)
EGFRCR SERPLBLD CKD-EPI 2021: 80 ML/MIN/1.73M*2
EOSINOPHIL # BLD AUTO: 0.08 X10*3/UL (ref 0–0.7)
EOSINOPHIL NFR BLD AUTO: 0.5 %
ERYTHROCYTE [DISTWIDTH] IN BLOOD BY AUTOMATED COUNT: 12.3 % (ref 11.5–14.5)
FLUAV RNA RESP QL NAA+PROBE: NOT DETECTED
FLUBV RNA RESP QL NAA+PROBE: NOT DETECTED
GLUCOSE SERPL-MCNC: 112 MG/DL (ref 74–99)
HCT VFR BLD AUTO: 38.1 % (ref 36–46)
HGB BLD-MCNC: 12.9 G/DL (ref 12–16)
IMM GRANULOCYTES # BLD AUTO: 0.07 X10*3/UL (ref 0–0.7)
IMM GRANULOCYTES NFR BLD AUTO: 0.5 % (ref 0–0.9)
LYMPHOCYTES # BLD AUTO: 2.15 X10*3/UL (ref 1.2–4.8)
LYMPHOCYTES NFR BLD AUTO: 14.5 %
MCH RBC QN AUTO: 31.2 PG (ref 26–34)
MCHC RBC AUTO-ENTMCNC: 33.9 G/DL (ref 32–36)
MCV RBC AUTO: 92 FL (ref 80–100)
MONOCYTES # BLD AUTO: 1.05 X10*3/UL (ref 0.1–1)
MONOCYTES NFR BLD AUTO: 7.1 %
NEUTROPHILS # BLD AUTO: 11.47 X10*3/UL (ref 1.2–7.7)
NEUTROPHILS NFR BLD AUTO: 77.1 %
NRBC BLD-RTO: 0 /100 WBCS (ref 0–0)
PLATELET # BLD AUTO: 174 X10*3/UL (ref 150–450)
POTASSIUM SERPL-SCNC: 3.6 MMOL/L (ref 3.5–5.3)
PROT SERPL-MCNC: 7 G/DL (ref 6.4–8.2)
RBC # BLD AUTO: 4.14 X10*6/UL (ref 4–5.2)
SARS-COV-2 RNA RESP QL NAA+PROBE: NOT DETECTED
SODIUM SERPL-SCNC: 137 MMOL/L (ref 136–145)
WBC # BLD AUTO: 14.9 X10*3/UL (ref 4.4–11.3)

## 2024-11-17 PROCEDURE — 93005 ELECTROCARDIOGRAM TRACING: CPT

## 2024-11-17 PROCEDURE — 2500000001 HC RX 250 WO HCPCS SELF ADMINISTERED DRUGS (ALT 637 FOR MEDICARE OP): Performed by: EMERGENCY MEDICINE

## 2024-11-17 PROCEDURE — 87636 SARSCOV2 & INF A&B AMP PRB: CPT | Performed by: EMERGENCY MEDICINE

## 2024-11-17 PROCEDURE — 71045 X-RAY EXAM CHEST 1 VIEW: CPT | Performed by: STUDENT IN AN ORGANIZED HEALTH CARE EDUCATION/TRAINING PROGRAM

## 2024-11-17 PROCEDURE — 36415 COLL VENOUS BLD VENIPUNCTURE: CPT | Performed by: EMERGENCY MEDICINE

## 2024-11-17 PROCEDURE — 80053 COMPREHEN METABOLIC PANEL: CPT | Performed by: EMERGENCY MEDICINE

## 2024-11-17 PROCEDURE — 99284 EMERGENCY DEPT VISIT MOD MDM: CPT | Mod: 25

## 2024-11-17 PROCEDURE — 2500000002 HC RX 250 W HCPCS SELF ADMINISTERED DRUGS (ALT 637 FOR MEDICARE OP, ALT 636 FOR OP/ED): Mod: MUE | Performed by: EMERGENCY MEDICINE

## 2024-11-17 PROCEDURE — 94640 AIRWAY INHALATION TREATMENT: CPT

## 2024-11-17 PROCEDURE — 2500000004 HC RX 250 GENERAL PHARMACY W/ HCPCS (ALT 636 FOR OP/ED): Performed by: EMERGENCY MEDICINE

## 2024-11-17 PROCEDURE — 83880 ASSAY OF NATRIURETIC PEPTIDE: CPT | Performed by: EMERGENCY MEDICINE

## 2024-11-17 PROCEDURE — 85025 COMPLETE CBC W/AUTO DIFF WBC: CPT | Performed by: EMERGENCY MEDICINE

## 2024-11-17 PROCEDURE — 96374 THER/PROPH/DIAG INJ IV PUSH: CPT

## 2024-11-17 PROCEDURE — 71045 X-RAY EXAM CHEST 1 VIEW: CPT

## 2024-11-17 PROCEDURE — 84484 ASSAY OF TROPONIN QUANT: CPT | Performed by: EMERGENCY MEDICINE

## 2024-11-17 RX ORDER — IPRATROPIUM BROMIDE AND ALBUTEROL SULFATE 2.5; .5 MG/3ML; MG/3ML
3 SOLUTION RESPIRATORY (INHALATION) ONCE
Status: COMPLETED | OUTPATIENT
Start: 2024-11-17 | End: 2024-11-17

## 2024-11-17 RX ORDER — DOXYCYCLINE 100 MG/1
100 CAPSULE ORAL ONCE
Status: COMPLETED | OUTPATIENT
Start: 2024-11-17 | End: 2024-11-17

## 2024-11-17 RX ORDER — DOXYCYCLINE 100 MG/1
100 CAPSULE ORAL 2 TIMES DAILY
Qty: 20 CAPSULE | Refills: 0 | Status: SHIPPED | OUTPATIENT
Start: 2024-11-17 | End: 2024-11-27

## 2024-11-17 RX ORDER — PREDNISONE 20 MG/1
40 TABLET ORAL DAILY
Qty: 10 TABLET | Refills: 0 | Status: SHIPPED | OUTPATIENT
Start: 2024-11-17 | End: 2024-11-22

## 2024-11-17 ASSESSMENT — COLUMBIA-SUICIDE SEVERITY RATING SCALE - C-SSRS
2. HAVE YOU ACTUALLY HAD ANY THOUGHTS OF KILLING YOURSELF?: NO
1. IN THE PAST MONTH, HAVE YOU WISHED YOU WERE DEAD OR WISHED YOU COULD GO TO SLEEP AND NOT WAKE UP?: NO
6. HAVE YOU EVER DONE ANYTHING, STARTED TO DO ANYTHING, OR PREPARED TO DO ANYTHING TO END YOUR LIFE?: NO

## 2024-11-17 ASSESSMENT — PAIN SCALES - GENERAL: PAINLEVEL_OUTOF10: 10 - WORST POSSIBLE PAIN

## 2024-11-17 ASSESSMENT — PAIN - FUNCTIONAL ASSESSMENT: PAIN_FUNCTIONAL_ASSESSMENT: 0-10

## 2024-11-17 NOTE — ED PROVIDER NOTES
HPI   Chief Complaint   Patient presents with    Shortness of Breath     Reports she wears 2L O2 at night. States she normally has low SpO2, 87% is normal. Hx COPD    Cough     Report she took Covid and flu tests, both negative.     Nasal Congestion    Earache       Presents to the emergency department secondary to cough shortness of breath and congestion.  Symptoms have been present for the last few days.  She tried testing for flu and COVID at home with negative testing.  She thought maybe she was having a reaction to MiraLAX.  She states that she spilled it a few weeks ago.  She thought maybe that had irritated her system.  She has no chest pain.  No abdominal pain.  No fever or chills.  No nausea or vomiting.  No other complaints at this time.  Patient has a history of COPD.  She does have home oxygen.  She states that her physician told her she is post to wear it 8 hours a day only.  Because of this she wears it only when she is sleeping.  She states that she sleeps 10 to 12 hours a day.  Patient is hypoxic in triage with a pulse ox of 87 to 89%.  She states that is normal for her.                        Charly Coma Scale Score: 15                  Patient History   Past Medical History:   Diagnosis Date    Anxiety 09/19/2012    Mixed anxiety depressive disorder 02/23/2022    Old myocardial infarction 11/19/2020    H/O non-ST elevation myocardial infarction (NSTEMI)    Opioid dependence, uncomplicated (Multi) 11/05/2020    Opioid dependence    Personal history of other diseases of the digestive system     History of ischemic colitis    Personal history of other infectious and parasitic diseases     H/O Clostridium difficile infection    Posterior reversible encephalopathy syndrome 12/11/2021    Primary hypertension 12/11/2021     Past Surgical History:   Procedure Laterality Date    OTHER SURGICAL HISTORY  11/11/2019    Retinal detachment repair    OTHER SURGICAL HISTORY  11/11/2019    Carpal tunnel surgery     OTHER SURGICAL HISTORY  11/05/2020    Tubal ligation    OTHER SURGICAL HISTORY  02/23/2022    Eye surgery    OTHER SURGICAL HISTORY  11/05/2020    Coronary artery stent placement     No family history on file.  Social History     Tobacco Use    Smoking status: Every Day     Current packs/day: 0.50     Types: Cigarettes    Smokeless tobacco: Never   Vaping Use    Vaping status: Never Used   Substance Use Topics    Alcohol use: Never    Drug use: Never       Physical Exam   ED Triage Vitals [11/17/24 0720]   Temperature Heart Rate Respirations BP   37.4 °C (99.3 °F) 71 16 154/80      Pulse Ox Temp Source Heart Rate Source Patient Position   (!) 87 % Temporal -- --      BP Location FiO2 (%)     -- --       Physical Exam  Constitutional:       Appearance: Normal appearance.   HENT:      Head: Normocephalic.      Right Ear: Tympanic membrane normal.      Left Ear: Tympanic membrane normal.      Nose: Nose normal.      Mouth/Throat:      Mouth: Mucous membranes are moist.   Eyes:      Extraocular Movements: Extraocular movements intact.      Pupils: Pupils are equal, round, and reactive to light.   Cardiovascular:      Rate and Rhythm: Normal rate and regular rhythm.      Pulses: Normal pulses.      Heart sounds: Normal heart sounds.   Pulmonary:      Effort: Pulmonary effort is normal.      Breath sounds: Examination of the right-middle field reveals decreased breath sounds. Examination of the left-middle field reveals decreased breath sounds. Examination of the right-lower field reveals decreased breath sounds. Examination of the left-lower field reveals decreased breath sounds. Decreased breath sounds present. No rhonchi or rales.   Chest:      Chest wall: No tenderness or crepitus.   Abdominal:      General: Abdomen is flat. Bowel sounds are normal.      Palpations: Abdomen is soft. There is no mass.      Tenderness: There is no abdominal tenderness. There is no rebound.   Musculoskeletal:         General: Normal  range of motion.      Right lower leg: No tenderness. No edema.      Left lower leg: No tenderness. No edema.   Skin:     General: Skin is warm and dry.      Capillary Refill: Capillary refill takes less than 2 seconds.   Neurological:      General: No focal deficit present.      Mental Status: She is alert and oriented to person, place, and time.   Psychiatric:         Mood and Affect: Mood normal.         Behavior: Behavior normal.     Labs Reviewed   CBC WITH AUTO DIFFERENTIAL   COMPREHENSIVE METABOLIC PANEL   TROPONIN I, HIGH SENSITIVITY   B-TYPE NATRIURETIC PEPTIDE   SARS-COV-2 PCR   INFLUENZA A AND B PCR     Pain Management Panel  More data may exist         Latest Ref Rng & Units 2/23/2022 12/9/2021   Pain Management Panel   Amphetamine Screen, Urine NEGATIVE PRESUMPTIVE NEGATIVE  PRESUMPTIVE NEGATIVE    Barbiturate Screen, Urine NEGATIVE PRESUMPTIVE NEGATIVE  PRESUMPTIVE NEGATIVE    Codeine IgE Cutoff <50 ng/mL <50  -   Fentanyl Screen, Urine NEGATIVE - PRESUMPTIVE NEGATIVE    Hydromorphone Urine Cutoff <25 ng/mL <25  -   Methadone Screen, Urine NEGATIVE - PRESUMPTIVE NEGATIVE    Morphine  Cutoff <50 ng/mL <50  -      Details                 XR chest 1 view    (Results Pending)     ED Course & MDM   Diagnoses as of 11/17/24 0906   COPD exacerbation (Multi)       Medical Decision Making  Patient presents secondary to cough congestion and shortness of breath.  Patient was found to be hypoxic in triage.  She is placed on nasal cannula oxygen.  Patient is evaluated in the emergency department for COVID, flu, pneumonia or COPD exacerbation.  Patient is given 2 DuoNeb breathing treatments and Solu-Medrol.  Laboratory workup chest x-ray and EKG are obtained.  Chest x-ray shows possible developing pneumonia versus chronic bronchitis versus mild interstitial edema.  Patient feels improved after breathing treatments.  Now able to ambulate with pulse ox of 91 to 95%.  Laboratory workup shows a mild leukocytosis but no  other acute findings.  Patient is currently hemodynamically stable.  Patient is to monitor symptoms and return for worsening symptoms.  She is discharged home on prednisone and doxycycline.  She will follow-up with primary care return here.        Procedure  ECG 12 lead    Performed by: Ashia Carpio MD  Authorized by: Ashia Carpio MD    Interpretation:     Details:  EKG was obtained at 8:04 AM.  It is sinus rhythm rate of 66.  No acute ST elevation.  NY interval is 167 and QTc is 448.       Ashia Carpio MD  11/17/24 0950

## 2024-11-18 LAB
ATRIAL RATE: 65 BPM
P AXIS: 56 DEGREES
PR INTERVAL: 167 MS
Q ONSET: 253 MS
QRS COUNT: 11 BEATS
QRS DURATION: 84 MS
QT INTERVAL: 427 MS
QTC CALCULATION(BAZETT): 448 MS
QTC FREDERICIA: 441 MS
R AXIS: 47 DEGREES
T AXIS: 61 DEGREES
T OFFSET: 467 MS
VENTRICULAR RATE: 66 BPM

## 2025-03-12 ENCOUNTER — TELEPHONE (OUTPATIENT)
Facility: CLINIC | Age: 70
End: 2025-03-12
Payer: MEDICAID

## 2025-03-12 NOTE — TELEPHONE ENCOUNTER
Received a referral for Colon Cancer Screening [Z12.11] from Dr. Potter    Reviewing referral there is Plavix listed as a medication the Patient is taking. Please confirm, if so Patient needs to be scheduled for a Office Visit    However if the Patient is not taking Plavix may proceed with Open Access.    Left message on Machine to return call for Open Access Colonoscopy Screening Form.

## 2025-04-16 ENCOUNTER — APPOINTMENT (OUTPATIENT)
Facility: CLINIC | Age: 70
End: 2025-04-16
Payer: MEDICAID

## 2025-05-08 ENCOUNTER — APPOINTMENT (OUTPATIENT)
Facility: CLINIC | Age: 70
End: 2025-05-08
Payer: MEDICAID